# Patient Record
Sex: FEMALE | Race: BLACK OR AFRICAN AMERICAN | NOT HISPANIC OR LATINO | Employment: FULL TIME | URBAN - METROPOLITAN AREA
[De-identification: names, ages, dates, MRNs, and addresses within clinical notes are randomized per-mention and may not be internally consistent; named-entity substitution may affect disease eponyms.]

---

## 2018-12-21 ENCOUNTER — HISTORICAL (OUTPATIENT)
Dept: INTERNAL MEDICINE | Facility: CLINIC | Age: 38
End: 2018-12-21

## 2018-12-21 LAB
ABS NEUT (OLG): 3.55 X10(3)/MCL (ref 2.1–9.2)
ALBUMIN SERPL-MCNC: 3.4 GM/DL (ref 3.4–5)
ALBUMIN/GLOB SERPL: 1 RATIO (ref 1–2)
ALP SERPL-CCNC: 98 UNIT/L (ref 45–117)
ALT SERPL-CCNC: 23 UNIT/L (ref 12–78)
APPEARANCE, UA: CLEAR
AST SERPL-CCNC: 18 UNIT/L (ref 15–37)
BACTERIA #/AREA URNS AUTO: ABNORMAL /[HPF]
BASOPHILS # BLD AUTO: 0.06 X10(3)/MCL
BASOPHILS NFR BLD AUTO: 1 %
BILIRUB SERPL-MCNC: 0.2 MG/DL (ref 0.2–1)
BILIRUB UR QL STRIP: NEGATIVE
BILIRUBIN DIRECT+TOT PNL SERPL-MCNC: <0.1 MG/DL
BILIRUBIN DIRECT+TOT PNL SERPL-MCNC: >0.1 MG/DL
BUN SERPL-MCNC: 10 MG/DL (ref 7–18)
CALCIUM SERPL-MCNC: 8.5 MG/DL (ref 8.5–10.1)
CHLORIDE SERPL-SCNC: 104 MMOL/L (ref 98–107)
CHOLEST SERPL-MCNC: 196 MG/DL
CHOLEST/HDLC SERPL: 3.1 {RATIO} (ref 0–4.4)
CO2 SERPL-SCNC: 27 MMOL/L (ref 21–32)
COLOR UR: ABNORMAL
CREAT SERPL-MCNC: 0.6 MG/DL (ref 0.6–1.3)
CREAT UR-MCNC: 127 MG/DL
EOSINOPHIL # BLD AUTO: 0.12 X10(3)/MCL
EOSINOPHIL NFR BLD AUTO: 2 %
ERYTHROCYTE [DISTWIDTH] IN BLOOD BY AUTOMATED COUNT: 14.9 % (ref 11.5–14.5)
EST. AVERAGE GLUCOSE BLD GHB EST-MCNC: 148 MG/DL
GLOBULIN SER-MCNC: 4.7 GM/ML (ref 2.3–3.5)
GLUCOSE (UA): NORMAL
GLUCOSE SERPL-MCNC: 96 MG/DL (ref 74–106)
HAV IGM SERPL QL IA: NONREACTIVE
HBA1C MFR BLD: 6.8 % (ref 4.2–6.3)
HBV CORE IGM SERPL QL IA: NONREACTIVE
HBV SURFACE AG SERPL QL IA: NEGATIVE
HCT VFR BLD AUTO: 36.4 % (ref 35–46)
HCV AB SERPL QL IA: NONREACTIVE
HDLC SERPL-MCNC: 64 MG/DL
HGB BLD-MCNC: 11.3 GM/DL (ref 12–16)
HGB UR QL STRIP: NEGATIVE
HIV 1+2 AB+HIV1 P24 AG SERPL QL IA: NONREACTIVE
HYALINE CASTS #/AREA URNS LPF: ABNORMAL /[LPF]
IMM GRANULOCYTES # BLD AUTO: 0.01 10*3/UL
IMM GRANULOCYTES NFR BLD AUTO: 0 %
KETONES UR QL STRIP: NEGATIVE
LDLC SERPL CALC-MCNC: 114 MG/DL (ref 0–130)
LEUKOCYTE ESTERASE UR QL STRIP: 250 LEU/UL
LYMPHOCYTES # BLD AUTO: 2.16 X10(3)/MCL
LYMPHOCYTES NFR BLD AUTO: 35 % (ref 13–40)
MCH RBC QN AUTO: 25 PG (ref 26–34)
MCHC RBC AUTO-ENTMCNC: 31 GM/DL (ref 31–37)
MCV RBC AUTO: 80.5 FL (ref 80–100)
MICROALBUMIN UR-MCNC: 18.8 MG/L (ref 0–19)
MICROALBUMIN/CREAT RATIO PNL UR: 14.8 MCG/MG CR (ref 0–29)
MONOCYTES # BLD AUTO: 0.31 X10(3)/MCL
MONOCYTES NFR BLD AUTO: 5 % (ref 4–12)
NEUTROPHILS # BLD AUTO: 3.55 X10(3)/MCL
NEUTROPHILS NFR BLD AUTO: 57 X10(3)/MCL
NITRITE UR QL STRIP: NEGATIVE
PH UR STRIP: 7.5 [PH] (ref 4.5–8)
PLATELET # BLD AUTO: 315 X10(3)/MCL (ref 130–400)
PMV BLD AUTO: 10.5 FL (ref 7.4–10.4)
POTASSIUM SERPL-SCNC: 3.9 MMOL/L (ref 3.5–5.1)
PROT SERPL-MCNC: 8.1 GM/DL (ref 6.4–8.2)
PROT UR QL STRIP: 10 MG/DL
RBC # BLD AUTO: 4.52 X10(6)/MCL (ref 4–5.2)
RBC #/AREA URNS AUTO: ABNORMAL /[HPF]
SODIUM SERPL-SCNC: 137 MMOL/L (ref 136–145)
SP GR UR STRIP: 1.02 (ref 1–1.03)
SQUAMOUS #/AREA URNS LPF: >100 /[LPF]
T PALLIDUM AB SER QL: NONREACTIVE
TRIGL SERPL-MCNC: 89 MG/DL
TSH SERPL-ACNC: 1.08 MIU/L (ref 0.36–3.74)
UROBILINOGEN UR STRIP-ACNC: NORMAL
VLDLC SERPL CALC-MCNC: 18 MG/DL
WBC # SPEC AUTO: 6.2 X10(3)/MCL (ref 4.5–11)
WBC #/AREA URNS AUTO: ABNORMAL /HPF

## 2019-03-13 ENCOUNTER — HISTORICAL (OUTPATIENT)
Dept: INTERNAL MEDICINE | Facility: CLINIC | Age: 39
End: 2019-03-13

## 2019-03-13 LAB
EST. AVERAGE GLUCOSE BLD GHB EST-MCNC: 140 MG/DL
HBA1C MFR BLD: 6.5 % (ref 4.2–6.3)

## 2022-04-10 ENCOUNTER — HISTORICAL (OUTPATIENT)
Dept: ADMINISTRATIVE | Facility: HOSPITAL | Age: 42
End: 2022-04-10

## 2022-04-26 VITALS
HEIGHT: 64 IN | BODY MASS INDEX: 36.47 KG/M2 | DIASTOLIC BLOOD PRESSURE: 85 MMHG | SYSTOLIC BLOOD PRESSURE: 137 MMHG | WEIGHT: 213.63 LBS

## 2024-03-07 DIAGNOSIS — D25.9 UTERINE LEIOMYOMA, UNSPECIFIED LOCATION: Primary | ICD-10-CM

## 2024-03-07 DIAGNOSIS — N92.1 EXCESSIVE AND FREQUENT MENSTRUATION WITH IRREGULAR CYCLE: ICD-10-CM

## 2024-03-14 ENCOUNTER — HOSPITAL ENCOUNTER (OUTPATIENT)
Facility: HOSPITAL | Age: 44
Discharge: HOME | End: 2024-03-16
Attending: INTERNAL MEDICINE | Admitting: INTERNAL MEDICINE
Payer: MEDICAID

## 2024-03-14 DIAGNOSIS — D25.0 SUBMUCOUS UTERINE FIBROID: ICD-10-CM

## 2024-03-14 DIAGNOSIS — D64.9 SYMPTOMATIC ANEMIA: ICD-10-CM

## 2024-03-14 DIAGNOSIS — I16.1 HYPERTENSIVE EMERGENCY WITHOUT CONGESTIVE HEART FAILURE: ICD-10-CM

## 2024-03-14 DIAGNOSIS — N30.01 ACUTE HEMORRHAGIC CYSTITIS: ICD-10-CM

## 2024-03-14 DIAGNOSIS — N89.8 VAGINAL MASS: Primary | ICD-10-CM

## 2024-03-14 DIAGNOSIS — N93.9 ABNORMAL UTERINE BLEEDING: ICD-10-CM

## 2024-03-14 DIAGNOSIS — N93.8 DYSFUNCTIONAL UTERINE BLEEDING: ICD-10-CM

## 2024-03-14 DIAGNOSIS — N17.9 AKI (ACUTE KIDNEY INJURY): ICD-10-CM

## 2024-03-14 DIAGNOSIS — I16.1 HYPERTENSIVE EMERGENCY: ICD-10-CM

## 2024-03-14 DIAGNOSIS — R07.9 CHEST PAIN: ICD-10-CM

## 2024-03-14 LAB
ABORH RETYPE: NORMAL
ALBUMIN SERPL-MCNC: 2.8 G/DL (ref 3.5–5)
ALBUMIN/GLOB SERPL: 0.6 RATIO (ref 1.1–2)
ALP SERPL-CCNC: 86 UNIT/L (ref 40–150)
ALT SERPL-CCNC: 7 UNIT/L (ref 0–55)
ANISOCYTOSIS BLD QL SMEAR: ABNORMAL
APPEARANCE UR: ABNORMAL
AST SERPL-CCNC: 8 UNIT/L (ref 5–34)
BACTERIA #/AREA URNS AUTO: ABNORMAL /HPF
BASOPHILS # BLD AUTO: 0.04 X10(3)/MCL
BASOPHILS NFR BLD AUTO: 0.3 %
BILIRUB SERPL-MCNC: 0.3 MG/DL
BILIRUB UR QL STRIP.AUTO: NEGATIVE
BUN SERPL-MCNC: 18.4 MG/DL (ref 7–18.7)
CALCIUM SERPL-MCNC: 9.2 MG/DL (ref 8.4–10.2)
CHLORIDE SERPL-SCNC: 104 MMOL/L (ref 98–107)
CO2 SERPL-SCNC: 22 MMOL/L (ref 22–29)
COLOR UR AUTO: YELLOW
CREAT SERPL-MCNC: 2.03 MG/DL (ref 0.55–1.02)
EOSINOPHIL # BLD AUTO: 0.06 X10(3)/MCL (ref 0–0.9)
EOSINOPHIL NFR BLD AUTO: 0.4 %
ERYTHROCYTE [DISTWIDTH] IN BLOOD BY AUTOMATED COUNT: 18.5 % (ref 11.5–17)
FERRITIN SERPL-MCNC: 124.24 NG/ML (ref 4.63–204)
GFR SERPLBLD CREATININE-BSD FMLA CKD-EPI: 31 MLS/MIN/1.73/M2
GLOBULIN SER-MCNC: 4.5 GM/DL (ref 2.4–3.5)
GLUCOSE SERPL-MCNC: 168 MG/DL (ref 74–100)
GLUCOSE UR QL STRIP.AUTO: NORMAL
GROUP & RH: NORMAL
HCT VFR BLD AUTO: 23.3 % (ref 37–47)
HGB BLD-MCNC: 6.8 G/DL (ref 12–16)
HOLD SPECIMEN: NORMAL
HYALINE CASTS #/AREA URNS LPF: ABNORMAL /LPF
HYPOCHROMIA BLD QL SMEAR: ABNORMAL
IMM GRANULOCYTES # BLD AUTO: 0.11 X10(3)/MCL (ref 0–0.04)
IMM GRANULOCYTES NFR BLD AUTO: 0.8 %
INDIRECT COOMBS: NORMAL
IRON SATN MFR SERPL: 4 % (ref 20–50)
IRON SERPL-MCNC: 8 UG/DL (ref 50–170)
KETONES UR QL STRIP.AUTO: NEGATIVE
LACTATE SERPL-SCNC: 1 MMOL/L (ref 0.5–2.2)
LEUKOCYTE ESTERASE UR QL STRIP.AUTO: 500
LYMPHOCYTES # BLD AUTO: 1.46 X10(3)/MCL (ref 0.6–4.6)
LYMPHOCYTES NFR BLD AUTO: 10.4 %
MAGNESIUM SERPL-MCNC: 2 MG/DL (ref 1.6–2.6)
MCH RBC QN AUTO: 20.5 PG (ref 27–31)
MCHC RBC AUTO-ENTMCNC: 29.2 G/DL (ref 33–36)
MCV RBC AUTO: 70.4 FL (ref 80–94)
MICROCYTES BLD QL SMEAR: ABNORMAL
MONOCYTES # BLD AUTO: 0.8 X10(3)/MCL (ref 0.1–1.3)
MONOCYTES NFR BLD AUTO: 5.7 %
MUCOUS THREADS URNS QL MICRO: ABNORMAL /LPF
NEUTROPHILS # BLD AUTO: 11.6 X10(3)/MCL (ref 2.1–9.2)
NEUTROPHILS NFR BLD AUTO: 82.4 %
NITRITE UR QL STRIP.AUTO: NEGATIVE
NRBC BLD AUTO-RTO: 0 %
PH UR STRIP.AUTO: 5.5 [PH]
PHOSPHATE SERPL-MCNC: 4.3 MG/DL (ref 2.3–4.7)
PLATELET # BLD AUTO: 398 X10(3)/MCL (ref 130–400)
PLATELET # BLD EST: ADEQUATE 10*3/UL
PMV BLD AUTO: 9.9 FL (ref 7.4–10.4)
POTASSIUM SERPL-SCNC: 3.7 MMOL/L (ref 3.5–5.1)
PROT SERPL-MCNC: 7.3 GM/DL (ref 6.4–8.3)
PROT UR QL STRIP.AUTO: ABNORMAL
RBC # BLD AUTO: 3.31 X10(6)/MCL (ref 4.2–5.4)
RBC #/AREA URNS AUTO: >100 /HPF
RBC MORPH BLD: ABNORMAL
RBC UR QL AUTO: ABNORMAL
SODIUM SERPL-SCNC: 139 MMOL/L (ref 136–145)
SP GR UR STRIP.AUTO: 1.01 (ref 1–1.03)
SPECIMEN OUTDATE: NORMAL
SQUAMOUS #/AREA URNS LPF: ABNORMAL /HPF
TEAR DROP CELL (OLG): ABNORMAL
TIBC SERPL-MCNC: 199 UG/DL (ref 70–310)
TIBC SERPL-MCNC: 207 UG/DL (ref 250–450)
TRANSFERRIN SERPL-MCNC: 200 MG/DL (ref 180–382)
TSH SERPL-ACNC: 0.96 UIU/ML (ref 0.35–4.94)
URATE CRY URNS QL MICRO: ABNORMAL /HPF
UROBILINOGEN UR STRIP-ACNC: NORMAL
WBC # SPEC AUTO: 14.07 X10(3)/MCL (ref 4.5–11.5)
WBC #/AREA URNS AUTO: ABNORMAL /HPF
WBC CLUMPS UR QL AUTO: ABNORMAL

## 2024-03-14 PROCEDURE — 96361 HYDRATE IV INFUSION ADD-ON: CPT

## 2024-03-14 PROCEDURE — 96365 THER/PROPH/DIAG IV INF INIT: CPT | Mod: 59

## 2024-03-14 PROCEDURE — 83605 ASSAY OF LACTIC ACID: CPT | Performed by: INTERNAL MEDICINE

## 2024-03-14 PROCEDURE — 81001 URINALYSIS AUTO W/SCOPE: CPT | Performed by: PHYSICIAN ASSISTANT

## 2024-03-14 PROCEDURE — 96375 TX/PRO/DX INJ NEW DRUG ADDON: CPT

## 2024-03-14 PROCEDURE — 99291 CRITICAL CARE FIRST HOUR: CPT

## 2024-03-14 PROCEDURE — 25000003 PHARM REV CODE 250

## 2024-03-14 PROCEDURE — P9016 RBC LEUKOCYTES REDUCED: HCPCS | Performed by: INTERNAL MEDICINE

## 2024-03-14 PROCEDURE — 86901 BLOOD TYPING SEROLOGIC RH(D): CPT | Performed by: PHYSICIAN ASSISTANT

## 2024-03-14 PROCEDURE — 51702 INSERT TEMP BLADDER CATH: CPT

## 2024-03-14 PROCEDURE — 25000003 PHARM REV CODE 250: Performed by: INTERNAL MEDICINE

## 2024-03-14 PROCEDURE — 87040 BLOOD CULTURE FOR BACTERIA: CPT | Performed by: INTERNAL MEDICINE

## 2024-03-14 PROCEDURE — 80053 COMPREHEN METABOLIC PANEL: CPT | Performed by: PHYSICIAN ASSISTANT

## 2024-03-14 PROCEDURE — G0378 HOSPITAL OBSERVATION PER HR: HCPCS

## 2024-03-14 PROCEDURE — 87154 CUL TYP ID BLD PTHGN 6+ TRGT: CPT | Performed by: INTERNAL MEDICINE

## 2024-03-14 PROCEDURE — 87077 CULTURE AEROBIC IDENTIFY: CPT | Performed by: INTERNAL MEDICINE

## 2024-03-14 PROCEDURE — 96367 TX/PROPH/DG ADDL SEQ IV INF: CPT | Mod: 59

## 2024-03-14 PROCEDURE — 87086 URINE CULTURE/COLONY COUNT: CPT | Performed by: PHYSICIAN ASSISTANT

## 2024-03-14 PROCEDURE — 83540 ASSAY OF IRON: CPT

## 2024-03-14 PROCEDURE — 85025 COMPLETE CBC W/AUTO DIFF WBC: CPT | Performed by: PHYSICIAN ASSISTANT

## 2024-03-14 PROCEDURE — 84100 ASSAY OF PHOSPHORUS: CPT | Performed by: INTERNAL MEDICINE

## 2024-03-14 PROCEDURE — 83735 ASSAY OF MAGNESIUM: CPT | Performed by: INTERNAL MEDICINE

## 2024-03-14 PROCEDURE — 84443 ASSAY THYROID STIM HORMONE: CPT | Performed by: INTERNAL MEDICINE

## 2024-03-14 PROCEDURE — 86923 COMPATIBILITY TEST ELECTRIC: CPT | Mod: 91 | Performed by: INTERNAL MEDICINE

## 2024-03-14 PROCEDURE — 82728 ASSAY OF FERRITIN: CPT

## 2024-03-14 PROCEDURE — 63600175 PHARM REV CODE 636 W HCPCS: Mod: JZ,JG | Performed by: INTERNAL MEDICINE

## 2024-03-14 PROCEDURE — 63600175 PHARM REV CODE 636 W HCPCS: Mod: JZ,JG

## 2024-03-14 PROCEDURE — 36430 TRANSFUSION BLD/BLD COMPNT: CPT

## 2024-03-14 RX ORDER — NALOXONE HCL 0.4 MG/ML
0.02 VIAL (ML) INJECTION
Status: DISCONTINUED | OUTPATIENT
Start: 2024-03-14 | End: 2024-03-16 | Stop reason: HOSPADM

## 2024-03-14 RX ORDER — DIPHENHYDRAMINE HCL 25 MG
25 CAPSULE ORAL NIGHTLY PRN
Status: DISCONTINUED | OUTPATIENT
Start: 2024-03-14 | End: 2024-03-16

## 2024-03-14 RX ORDER — SODIUM CHLORIDE 0.9 % (FLUSH) 0.9 %
10 SYRINGE (ML) INJECTION EVERY 12 HOURS PRN
Status: DISCONTINUED | OUTPATIENT
Start: 2024-03-14 | End: 2024-03-16 | Stop reason: HOSPADM

## 2024-03-14 RX ORDER — HYDRALAZINE HYDROCHLORIDE 20 MG/ML
10 INJECTION INTRAMUSCULAR; INTRAVENOUS EVERY 4 HOURS PRN
Status: DISCONTINUED | OUTPATIENT
Start: 2024-03-14 | End: 2024-03-16 | Stop reason: HOSPADM

## 2024-03-14 RX ORDER — HYDROCODONE BITARTRATE AND ACETAMINOPHEN 5; 325 MG/1; MG/1
1 TABLET ORAL EVERY 6 HOURS PRN
Status: DISCONTINUED | OUTPATIENT
Start: 2024-03-14 | End: 2024-03-16 | Stop reason: HOSPADM

## 2024-03-14 RX ORDER — GLUCAGON 1 MG
1 KIT INJECTION
Status: DISCONTINUED | OUTPATIENT
Start: 2024-03-14 | End: 2024-03-16 | Stop reason: HOSPADM

## 2024-03-14 RX ORDER — SODIUM CHLORIDE 9 MG/ML
INJECTION, SOLUTION INTRAVENOUS CONTINUOUS
Status: DISCONTINUED | OUTPATIENT
Start: 2024-03-14 | End: 2024-03-16

## 2024-03-14 RX ORDER — ACETAMINOPHEN 325 MG/1
650 TABLET ORAL EVERY 6 HOURS PRN
Status: DISCONTINUED | OUTPATIENT
Start: 2024-03-14 | End: 2024-03-16 | Stop reason: HOSPADM

## 2024-03-14 RX ORDER — ONDANSETRON 4 MG/1
8 TABLET, ORALLY DISINTEGRATING ORAL EVERY 8 HOURS PRN
Status: DISCONTINUED | OUTPATIENT
Start: 2024-03-14 | End: 2024-03-16

## 2024-03-14 RX ORDER — LABETALOL HCL 20 MG/4 ML
10 SYRINGE (ML) INTRAVENOUS
Status: COMPLETED | OUTPATIENT
Start: 2024-03-14 | End: 2024-03-14

## 2024-03-14 RX ORDER — METRONIDAZOLE 500 MG/100ML
500 INJECTION, SOLUTION INTRAVENOUS
Status: DISCONTINUED | OUTPATIENT
Start: 2024-03-14 | End: 2024-03-16 | Stop reason: HOSPADM

## 2024-03-14 RX ORDER — HYDROCODONE BITARTRATE AND ACETAMINOPHEN 500; 5 MG/1; MG/1
TABLET ORAL
Status: DISCONTINUED | OUTPATIENT
Start: 2024-03-14 | End: 2024-03-16 | Stop reason: HOSPADM

## 2024-03-14 RX ORDER — IBUPROFEN 200 MG
16 TABLET ORAL
Status: DISCONTINUED | OUTPATIENT
Start: 2024-03-14 | End: 2024-03-16 | Stop reason: HOSPADM

## 2024-03-14 RX ORDER — DIPHENHYDRAMINE HCL 25 MG
25 CAPSULE ORAL NIGHTLY PRN
Status: DISCONTINUED | OUTPATIENT
Start: 2024-03-14 | End: 2024-03-14

## 2024-03-14 RX ORDER — DIPHENHYDRAMINE HCL 25 MG
25 CAPSULE ORAL EVERY 6 HOURS PRN
Status: DISCONTINUED | OUTPATIENT
Start: 2024-03-14 | End: 2024-03-14

## 2024-03-14 RX ORDER — TALC
6 POWDER (GRAM) TOPICAL NIGHTLY PRN
Status: DISCONTINUED | OUTPATIENT
Start: 2024-03-14 | End: 2024-03-16 | Stop reason: HOSPADM

## 2024-03-14 RX ORDER — LABETALOL HCL 20 MG/4 ML
10 SYRINGE (ML) INTRAVENOUS EVERY 4 HOURS PRN
Status: DISCONTINUED | OUTPATIENT
Start: 2024-03-14 | End: 2024-03-16 | Stop reason: HOSPADM

## 2024-03-14 RX ORDER — MEDROXYPROGESTERONE ACETATE 10 MG/1
20 TABLET ORAL DAILY
Status: DISCONTINUED | OUTPATIENT
Start: 2024-03-15 | End: 2024-03-16 | Stop reason: HOSPADM

## 2024-03-14 RX ORDER — IBUPROFEN 200 MG
24 TABLET ORAL
Status: DISCONTINUED | OUTPATIENT
Start: 2024-03-14 | End: 2024-03-16 | Stop reason: HOSPADM

## 2024-03-14 RX ADMIN — Medication 6 MG: at 11:03

## 2024-03-14 RX ADMIN — HYDROCODONE BITARTRATE AND ACETAMINOPHEN 1 TABLET: 5; 325 TABLET ORAL at 11:03

## 2024-03-14 RX ADMIN — DOXYCYCLINE 100 MG: 100 INJECTION, POWDER, LYOPHILIZED, FOR SOLUTION INTRAVENOUS at 06:03

## 2024-03-14 RX ADMIN — CEFTRIAXONE SODIUM 1 G: 1 INJECTION, POWDER, FOR SOLUTION INTRAMUSCULAR; INTRAVENOUS at 04:03

## 2024-03-14 RX ADMIN — LABETALOL HYDROCHLORIDE 10 MG: 5 INJECTION, SOLUTION INTRAVENOUS at 04:03

## 2024-03-14 RX ADMIN — METRONIDAZOLE 500 MG: 5 INJECTION, SOLUTION INTRAVENOUS at 07:03

## 2024-03-14 RX ADMIN — HYDRALAZINE HYDROCHLORIDE 10 MG: 20 INJECTION INTRAMUSCULAR; INTRAVENOUS at 06:03

## 2024-03-14 RX ADMIN — SODIUM CHLORIDE: 9 INJECTION, SOLUTION INTRAVENOUS at 05:03

## 2024-03-14 NOTE — CONSULTS
[facility] Consult H&P     Subjective:      History of Present Illness:  Elli Wu is a 44 y.o.  with pelvic pain and vaginal bleeding since D&C with primary OBGYN in [place] on 24. Pt endorses regular monthly menses prior to D&C. States she had D&C because her uterine lining was thick, does not recall why she had imaging of uterine lining in first place. Denies fevers, N/V, CP, SOB. Endorses fatigue.      Review of Systems:  Pertinent items are noted in HPI. All other systems are reviewed and are negative.    Past Medical History:  Past Medical History:   Diagnosis Date    Hypertension      Past Surgical History:  History reviewed. No pertinent surgical history.    Obstetrical History:  OB History   No obstetric history on file.    x6 BB 7#14    Gynecologic History:   Patient's last menstrual period was 2024.  STD history: denies  Pap smear history: denies hx abnormal; last pap unknown    Allergies:  Review of patient's allergies indicates:  No Known Allergies    Medications:   In-Hospital Scheduled Medications:   cefTRIAXone (Rocephin) IV (PEDS and ADULTS)  1 g Intravenous Q24H    doxycycline IV (PEDS and ADULTS)  100 mg Intravenous Q12H    [START ON 3/15/2024] medroxyPROGESTERone  20 mg Oral Daily    metronidazole  500 mg Intravenous Q8H      In-Hospital PRN Medications:  0.9%  NaCl infusion (for blood administration), dextrose 10%, dextrose 10%, glucagon (human recombinant), glucose, glucose, hydrALAZINE, labetalol, naloxone, ondansetron, sodium chloride 0.9%   In-Hospital IV Infusion Medications:   sodium chloride 0.9% 250 mL/hr at 24 1743      Home Medications:  Prior to Admission medications    Not on File       Family History:  History reviewed. No pertinent family history.    Social History:  Social History     Tobacco Use    Smoking status: Never    Smokeless tobacco: Never        Objective:   Last 24 Hour Vital Signs:  BP  Min: 169/93  Max: 204/96  Temp  Av.9 °F  (36.6 °C)  Min: 97.6 °F (36.4 °C)  Max: 98.1 °F (36.7 °C)  Pulse  Av.6  Min: 92  Max: 103  Resp  Av  Min: 16  Max: 27  SpO2  Av.5 %  Min: 98 %  Max: 100 %  Weight  Av kg (202 lb 13.2 oz)  Min: 92 kg (202 lb 13.2 oz)  Max: 92 kg (202 lb 13.2 oz)  No intake/output data recorded.  Body mass index is 34.63 kg/m².    Physical Examination:  Vitals:    24 1718 24 1719 24 1731 24 1746   BP: (!) 197/114  (!) 204/96 (!) 200/111   BP Location:       Patient Position:       Pulse: 97 98 98 96   Resp:  16 18 20   Temp:   97.9 °F (36.6 °C) 97.6 °F (36.4 °C)   TempSrc:       SpO2: 100% 100% 99% 99%   Weight:           Body mass index is 34.63 kg/m².    Gen: Alert, cooperative, no distress, appears stated age  CV: Regular rate  Chest: No conversational dyspnea  Abdomen: Soft, palpable mass in midline, mobile, likely uterus  Extrem: Extremities normal, atraumatic, no cyanosis or edema.  No calf tenderness or erythema.  External genitalia: Normal female genetalia without lesion, discharge or tenderness.  Speculum Exam: Vaginal vault with clear malodorous discharge; cervix unable to be visualized, obscurred by 5-6 cm necrotic mass, suspicious for prolapsed fibroid. No active bleeding noted  Bimanual Exam: Firm nodular 5-6 cm mass protruding from cervix, unable to palpate cervix behind mass, unable to palpate stalk. No parametrial masses nor firmness noted    Laboratory Results:  Lab Results   Component Value Date    WBC 14.07 (H) 2024    HGB 6.8 (L) 2024    HCT 23.3 (L) 2024    MCV 70.4 (L) 2024     2024     CMP  Sodium Level   Date Value Ref Range Status   2024 139 136 - 145 mmol/L Final     Potassium Level   Date Value Ref Range Status   2024 3.7 3.5 - 5.1 mmol/L Final     Carbon Dioxide   Date Value Ref Range Status   2024 22 22 - 29 mmol/L Final     Blood Urea Nitrogen   Date Value Ref Range Status   2024 18.4 7.0 - 18.7 mg/dL  Final     Creatinine   Date Value Ref Range Status   03/14/2024 2.03 (H) 0.55 - 1.02 mg/dL Final     Calcium Level Total   Date Value Ref Range Status   03/14/2024 9.2 8.4 - 10.2 mg/dL Final     Albumin Level   Date Value Ref Range Status   03/14/2024 2.8 (L) 3.5 - 5.0 g/dL Final     Bilirubin Total   Date Value Ref Range Status   03/14/2024 0.3 <=1.5 mg/dL Final     Alkaline Phosphatase   Date Value Ref Range Status   03/14/2024 86 40 - 150 unit/L Final     Aspartate Aminotransferase   Date Value Ref Range Status   03/14/2024 8 5 - 34 unit/L Final     Alanine Aminotransferase   Date Value Ref Range Status   03/14/2024 7 0 - 55 unit/L Final     eGFR   Date Value Ref Range Status   03/14/2024 31 mls/min/1.73/m2 Final         Radiology:  US Pelvis Comp with Transvag NON-OB (xpd    Result Date: 3/14/2024  EXAMINATION: US PELVIS COMP WITH TRANSVAG NON-OB (XPD) CLINICAL HISTORY: vaginal bleeding; TECHNIQUE: Transabdominal sonography of the pelvis was performed, followed by transvaginal sonography to better evaluate the uterus and ovaries. COMPARISON: CT abdomen pelvis 10/18/2021 FINDINGS: UTERUS/CERVIX: Size: 21 x 12 x 7 cm. Masses: Lower uterine segment obscured by shadowing on the transabdominal images.  Transvaginal imaging is suboptimal due to patient's inability to empty urinary bladder.  There is complex, heterogeneous echogenicity at the enlarged cervix measuring at least 8 cm.The area of interest is suboptimally visualized on transabdominal imaging due to shadowing of the pubic bone and on transvaginal imaging due to patient inability to adequately empty the urinary bladder. Endometrium: Suboptimally evaluated, estimated to measure 22 mm trans abdominally.. RIGHT OVARY: Size: 5 x 4 x 2 cm Appearance: Normal sonographic appearance. Vascular flow: Normal spectral waveforms. LEFT OVARY: Attempted visualization of the left ovary.  Left ovary not seen for unknown reason, possibly due to shadowing bowel gas and/or body  habitus. FREE FLUID: None     Abnormal, heterogeneous masslike enlargement of the cervix.  It is difficult to differentiate cervical mass from distension of the endocervical canal with blood products. Electronically signed by: Elif Mittal Date:    2024 Time:    16:08      CT A/P ordered     Assessment:     Elli Wu is a 44 y.o.  with pelvic pain and abnormal uterine bleeding likely 2/2 prolapsed fibroid. Also with HTN emergency with BP >180/110 and ISRAEL.        Recommendations:     - Uterine vs cervical mass: Upon review of imaging and physical exam suspect prolapsed uterine fibroid that is likely infected. Discussed with patient and primary team neoplasm cannot be ruled out, would recommend biopsy of mass at very least, but likely will need vaginal myomectomy.   Discussed with pt and primary team recommendation for antibiotics for likely intrauterine infection.   -- Will bring to clinic in AM for biopsy +/- removal of mass pending better BP control  -- doxycycline 100mg BID and flagyl 500mg BID for possible endometritis  -- provera 20mg daily to prevent uterine bleeding    - Enlarged uterus: 22cm uterus noted on exam tender to palpation, see above for antibiotics recommendation. Discussed unless has large fibroid that can be removed would likely not benefit from myomectomy and will need hysterectomy at some point, however will address prolapsed uterine mass first.     - Symptomatic anemia: agree with transfusion 2 units pRBCs, start provera 20mg daily to help prevent bleeding    - UTI: UA notable for blood, bacteria, concerning for UTI. Agree with ceftriaxone for empiric tx    Discussed with staff, Dr. Provost Angela De La Cruz MD, MPH  [facility], PGY4

## 2024-03-14 NOTE — ED PROVIDER NOTES
Encounter Date: 3/14/2024       History     Chief Complaint   Patient presents with    Vaginal Bleeding     PT REPORTS HEAVY VAG BLEEDING W CLOTS W LOWER LOWER ABD PAIN AND NAUSEA SINCE YESTERDAY.  RECENT D&C 2/20/24 W DX OF FIBROID.  CO URINARY HESITANCY, NO DYSURIA.  HX OF HTN, DID NOT TAKE BP MED. X 2 WKS.       Presents requesting Gyn appointment due to pelvic pain with vaginal bleeding secondary to fibroids. States her Gyn is in [place], he did a D/C on 2/20/24 after which he told her he was sending a referral to our Gyn clinic to be follow up. States did not receive pain medications or explain why her care was transferred to another Gyn. Hx of HTN.  States after D/C she spotted for a day, bleeding stopped and then started intermittently, now heavy, changing around every 2 hrs.    The history is provided by the patient.     Review of patient's allergies indicates:  No Known Allergies  Past Medical History:   Diagnosis Date    Hypertension      History reviewed. No pertinent surgical history.  History reviewed. No pertinent family history.  Social History     Tobacco Use    Smoking status: Never    Smokeless tobacco: Never     Review of Systems   Gastrointestinal:  Positive for abdominal distention.   Genitourinary:  Positive for pelvic pain and vaginal bleeding.   All other systems reviewed and are negative.      Physical Exam     Initial Vitals [03/14/24 1307]   BP Pulse Resp Temp SpO2   (!) 177/107 92 18 97.9 °F (36.6 °C) 100 %      MAP       --         Physical Exam    Nursing note and vitals reviewed.  Constitutional: She appears well-developed.   HENT:   Head: Normocephalic and atraumatic.   Mouth/Throat: Oropharynx is clear and moist.   Eyes: EOM are normal. Pupils are equal, round, and reactive to light.   Pale conjunctiva   Neck: Neck supple.   Normal range of motion.  Cardiovascular:  Normal rate, regular rhythm, normal heart sounds and intact distal pulses.           Pulmonary/Chest: Breath sounds  normal.   Abdominal: Abdomen is soft. Bowel sounds are normal. She exhibits distension. There is no abdominal tenderness. There is no rebound and no guarding.   Genitourinary:    Genitourinary Comments: Palpable mass among superior vaginal wall, abnormal cervix, blood clot on vault     Musculoskeletal:         General: No edema. Normal range of motion.      Cervical back: Normal range of motion and neck supple.     Neurological: She is alert and oriented to person, place, and time. She has normal strength. GCS score is 15. GCS eye subscore is 4. GCS verbal subscore is 5. GCS motor subscore is 6.   Skin: Skin is warm and dry. No rash noted. There is pallor.   Psychiatric: Her behavior is normal.         ED Course   Critical Care    Date/Time: 3/14/2024 4:33 PM    Performed by: Brandon Petty MD  Authorized by: Brandon Petty MD  Direct patient critical care time: 12 minutes  Additional history critical care time: 5 minutes  Ordering / reviewing critical care time: 12 minutes  Documentation critical care time: 5 minutes  Consulting other physicians critical care time: 5 minutes  Total critical care time (exclusive of procedural time) : 39 minutes  Critical care was necessary to treat or prevent imminent or life-threatening deterioration of the following conditions: circulatory failure and sepsis.  Critical care was time spent personally by me on the following activities: development of treatment plan with patient or surrogate, discussions with consultants, interpretation of cardiac output measurements, evaluation of patient's response to treatment, examination of patient, obtaining history from patient or surrogate, ordering and performing treatments and interventions, ordering and review of laboratory studies, ordering and review of radiographic studies, re-evaluation of patient's condition and review of old charts.        Labs Reviewed   COMPREHENSIVE METABOLIC PANEL - Abnormal; Notable  for the following components:       Result Value    Glucose Level 168 (*)     Creatinine 2.03 (*)     Albumin Level 2.8 (*)     Globulin 4.5 (*)     Albumin/Globulin Ratio 0.6 (*)     All other components within normal limits   URINALYSIS, REFLEX TO URINE CULTURE - Abnormal; Notable for the following components:    Appearance, UA Turbid (*)     Protein, UA Trace (*)     Blood, UA 3+ (*)     Leukocyte Esterase,  (*)     WBC, UA 21-50 (*)     WBC Clumps, UA Few (*)     Bacteria, UA Occ (*)     Squamous Epithelial Cells, UA Occ (*)     Mucous, UA Trace (*)     Uric Acid Crystals, UA Occ (*)     RBC, UA >100 (*)     All other components within normal limits   CBC WITH DIFFERENTIAL - Abnormal; Notable for the following components:    WBC 14.07 (*)     RBC 3.31 (*)     Hgb 6.8 (*)     Hct 23.3 (*)     MCV 70.4 (*)     MCH 20.5 (*)     MCHC 29.2 (*)     RDW 18.5 (*)     Neut # 11.60 (*)     IG# 0.11 (*)     All other components within normal limits   TSH - Normal   CULTURE, URINE   BLOOD CULTURE [facility]   BLOOD CULTURE [facility]   CBC W/ AUTO DIFFERENTIAL    Narrative:     The following orders were created for panel order CBC auto differential.  Procedure                               Abnormality         Status                     ---------                               -----------         ------                     CBC with Differential[[id number]]        Abnormal            Final result                 Please view results for these tests on the individual orders.   LACTIC ACID, PLASMA   MAGNESIUM   PHOSPHORUS   TYPE & SCREEN   ABORH RETYPE   PREPARE RBC SOFT          Imaging Results              US Pelvis Comp with Transvag NON-OB (xpd (Final result)  Result time 03/14/24 16:08:23      Final result by Elif Mittal MD (03/14/24 16:08:23)                   Impression:      Abnormal, heterogeneous masslike enlargement of the cervix.  It is difficult to differentiate cervical mass from distension of the endocervical  canal with blood products.      Electronically signed by: Elif Mittal  Date:    03/14/2024  Time:    16:08               Narrative:    EXAMINATION:  US PELVIS COMP WITH TRANSVAG NON-OB (XPD)    CLINICAL HISTORY:  vaginal bleeding;    TECHNIQUE:  Transabdominal sonography of the pelvis was performed, followed by transvaginal sonography to better evaluate the uterus and ovaries.    COMPARISON:  CT abdomen pelvis 10/18/2021    FINDINGS:  UTERUS/CERVIX:    Size: 21 x 12 x 7 cm.    Masses: Lower uterine segment obscured by shadowing on the transabdominal images.  Transvaginal imaging is suboptimal due to patient's inability to empty urinary bladder.  There is complex, heterogeneous echogenicity at the enlarged cervix measuring at least 8 cm.The area of interest is suboptimally visualized on transabdominal imaging due to shadowing of the pubic bone and on transvaginal imaging due to patient inability to adequately empty the urinary bladder.    Endometrium: Suboptimally evaluated, estimated to measure 22 mm trans abdominally..    RIGHT OVARY:    Size: 5 x 4 x 2 cm    Appearance: Normal sonographic appearance.    Vascular flow: Normal spectral waveforms.    LEFT OVARY:    Attempted visualization of the left ovary.  Left ovary not seen for unknown reason, possibly due to shadowing bowel gas and/or body habitus.    FREE FLUID:    None                                       Medications   cefTRIAXone (Rocephin) 1 g in dextrose 5 % in water (D5W) 100 mL IVPB (MB+) (has no administration in time range)   labetalol 20 mg/4 mL (5 mg/mL) IV syring (has no administration in time range)   0.9%  NaCl infusion (has no administration in time range)   0.9%  NaCl infusion (for blood administration) (has no administration in time range)     Medical Decision Making  Amount and/or Complexity of Data Reviewed  External Data Reviewed: radiology.     Details: Impression  No acute abnormality identified within the abdomen and pelvis  within  the limits of noncontrast exam.  Bilateral nonobstructing stones are noted with no evidence of  hydronephrosis. Previously identified right-sided obstructing stone  has resolved in the interval.      Electronically Signed By: Manny Mishra MD  Date/Time Signed: 10/18/2021 15:31    Labs: ordered. Decision-making details documented in ED Course.  Radiology: ordered.  Discussion of management or test interpretation with external provider(s): 4:21 PM Consult: I discussed the case with Dr. Simons (Gyn). Agrees with current management.   Recommends will evaluate in ED  4:21 PM Consult: I discussed the case with Dr. Pastrana ([facility]). Agrees with current management.   Recommends will evaluate in ED        Risk  Prescription drug management.      Additional MDM:   Differential Diagnosis:   Miscarriage, Threatening miscarriage, Traumatic injury, PID, STD, among others                                      Clinical Impression:  Final diagnoses:  [D64.9] Symptomatic anemia (Primary)  [I16.1] Hypertensive emergency without congestive heart failure  [N17.9] ISRAEL (acute kidney injury)  [N93.8] Dysfunctional uterine bleeding  [N30.01] Acute hemorrhagic cystitis          ED Disposition Condition    Admit Brandon Vick MD  03/14/24 [id number]

## 2024-03-14 NOTE — H&P
[facility] History & Physical Note     Resident Team: [facility] Medicine List 2  Attending Physician: Stephy Pierre MD  Resident: Suhas Barnard MD  Intern: Pooja Dang MD   Date of Admit: 3/14/2024    Chief Complaint     Vaginal Bleeding (PT REPORTS HEAVY VAG BLEEDING W CLOTS W LOWER LOWER ABD PAIN AND NAUSEA SINCE YESTERDAY.  RECENT D&C 2/20/24 W DX OF FIBROID.  CO URINARY HESITANCY, NO DYSURIA.  HX OF HTN, DID NOT TAKE BP MED. X 2 WKS.  )     Subjective:      History of Present Illness:  Elli Wu is a 44 y.o. female with a PMHx of HTN, uterine leiomyoma, who presented to [facility] ED on 3/14/2024  with a primary complaint of pelvic pain and vaginal bleeding. Onset was on 02/20/24 after patient underwent D&C in setting of thickened endometrium per patient. After the D/C, patient started having increasing uterine bleeding with passage of large clots and endometrial tissue. Out of concern, she went back to her OBGYN, which she was found to have uterine fibroids. She was then preferred to [facility] for further care. The patient admits to going through one full pack of pads daily these past few days and worsening suprapubic pain. Due to her OBYGN no prescribing her any pain medication, she decided to go the ED due to unbearable pain. She states she took some Norco from her pain in the interm for pain. She also admits that her OBGYN was managing her BP.    In ED VS: /107, HR 92, RR 18, SpO2 100% on room air, T 97.9 F. CMP showed BUN/Cr 18.4/2.03, CBC showed H/H 6.8/23.3; TSH/Lactic acid wnl, UA showed Leukocyte esterase 500, negative nitrites. Pelvic US showed abnormal, heterogeneous masslike enlargement of the cervix . CT ABD/Pelvis ordered and pending. Blood x2 and urine cultures were collected. Patient received 1x Rocephin 1g and 1x Labetalol 20 mg IV in the ED. GYN was consulted in setting of AUB. Medicine was consulted in setting of HTN emergency.     Admit to service under observation for further management of  HTN emergency. Consults: GYN.     Past Medical History:  Past Medical History:   Diagnosis Date    Hypertension        Past Surgical History:  History reviewed. No pertinent surgical history.    Family History:  History reviewed. No pertinent family history.    Social History:  Social History     Tobacco Use    Smoking status: Never    Smokeless tobacco: Never       Allergies:  Review of patient's allergies indicates:  No Known Allergies    Home Medications:  Prior to Admission medications    Not on File       Review of Systems:  Review of Systems   Constitutional:  Positive for malaise/fatigue and weight loss. Negative for chills and fever.   Respiratory:  Negative for shortness of breath.    Cardiovascular:  Negative for chest pain.   Gastrointestinal:  Positive for abdominal pain, diarrhea and nausea. Negative for constipation and vomiting.   Neurological:  Positive for weakness.         Objective:   Last 24 Hour Vital Signs:  BP  Min: 169/93  Max: 184/108  Temp  Av °F (36.7 °C)  Min: 97.9 °F (36.6 °C)  Max: 98.1 °F (36.7 °C)  Pulse  Av  Min: 92  Max: 103  Resp  Av.5  Min: 16  Max: 27  SpO2  Av.5 %  Min: 98 %  Max: 100 %  Weight  Av kg (202 lb 13.2 oz)  Min: 92 kg (202 lb 13.2 oz)  Max: 92 kg (202 lb 13.2 oz)  Body mass index is 34.63 kg/m².  No intake/output data recorded.    Physical Examination:  Physical Exam  Vitals and nursing note reviewed.   Constitutional:       Appearance: Normal appearance.   HENT:      Head: Normocephalic and atraumatic.      Nose: Nose normal.      Mouth/Throat:      Mouth: Mucous membranes are dry.      Pharynx: Oropharynx is clear.   Eyes:      Extraocular Movements: Extraocular movements intact.      Pupils: Pupils are equal, round, and reactive to light.      Comments: Conjunctival pallor   Cardiovascular:      Rate and Rhythm: Normal rate and regular rhythm.      Pulses: Normal pulses.      Heart sounds: Normal heart sounds.   Pulmonary:      Effort:  Pulmonary effort is normal.      Breath sounds: Normal breath sounds. No wheezing.   Abdominal:      General: There is distension.      Tenderness: There is abdominal tenderness.      Comments: Uterine fundus palpable above umbilicus; tender   Musculoskeletal:         General: No swelling or tenderness. Normal range of motion.      Cervical back: Normal range of motion and neck supple.   Skin:     General: Skin is dry.      Capillary Refill: Capillary refill takes 2 to 3 seconds.   Neurological:      General: No focal deficit present.      Mental Status: She is alert and oriented to person, place, and time.        Laboratory:  Most Recent Data:  CBC:   Lab Results   Component Value Date    WBC 14.07 (H) 03/14/2024    HGB 6.8 (L) 03/14/2024    HCT 23.3 (L) 03/14/2024     03/14/2024    MCV 70.4 (L) 03/14/2024    RDW 18.5 (H) 03/14/2024     WBC Differential:   Recent Labs   Lab 03/14/24  1401   WBC 14.07*   HGB 6.8*   HCT 23.3*      MCV 70.4*     BMP:   Lab Results   Component Value Date     03/14/2024    K 3.7 03/14/2024    CO2 22 03/14/2024    BUN 18.4 03/14/2024    CREATININE 2.03 (H) 03/14/2024    CALCIUM 9.2 03/14/2024     LFTs:   Lab Results   Component Value Date    ALBUMIN 2.8 (L) 03/14/2024    BILITOT 0.3 03/14/2024    AST 8 03/14/2024    ALKPHOS 86 03/14/2024    ALT 7 03/14/2024     Thyroid:   Lab Results   Component Value Date    TSH 0.959 03/14/2024      Urinalysis:   Lab Results   Component Value Date    COLORU Brown 10/18/2021    PHUA 5.5 03/14/2024    NITRITE Negative 10/18/2021    KETONESU Negative 10/18/2021    UROBILINOGEN Normal 03/14/2024    WBCUA 21-50 (A) 03/14/2024       Trended Lab Data:  Recent Labs   Lab 03/14/24  1401   WBC 14.07*   HGB 6.8*   HCT 23.3*      MCV 70.4*   RDW 18.5*      K 3.7   CO2 22   BUN 18.4   CREATININE 2.03*   ALBUMIN 2.8*   BILITOT 0.3   AST 8   ALKPHOS 86   ALT 7     Microbiology Data:  Microbiology Results (last 7 days)       Procedure  Component Value Units Date/Time    Blood culture x two cultures. Draw prior to antibiotics. [[id number]] Collected: 03/14/24 1611    Order Status: Sent Specimen: Blood from Antecubital, Right Updated: 03/14/24 1637    Blood culture x two cultures. Draw prior to antibiotics. [[id number]] Collected: 03/14/24 1630    Order Status: Sent Specimen: Blood from Hand, Left Updated: 03/14/24 1636    Urine culture [[id number]] Collected: 03/14/24 1340    Order Status: Sent Specimen: Urine Updated: 03/14/24 1416           Radiology:  Imaging Results              US Pelvis Comp with Transvag NON-OB (xpd (Final result)  Result time 03/14/24 16:08:23      Final result by Elif Mittal MD (03/14/24 16:08:23)                   Impression:      Abnormal, heterogeneous masslike enlargement of the cervix.  It is difficult to differentiate cervical mass from distension of the endocervical canal with blood products.      Electronically signed by: Elif Mittal  Date:    03/14/2024  Time:    16:08               Narrative:    EXAMINATION:  US PELVIS COMP WITH TRANSVAG NON-OB (XPD)    CLINICAL HISTORY:  vaginal bleeding;    TECHNIQUE:  Transabdominal sonography of the pelvis was performed, followed by transvaginal sonography to better evaluate the uterus and ovaries.    COMPARISON:  CT abdomen pelvis 10/18/2021    FINDINGS:  UTERUS/CERVIX:    Size: 21 x 12 x 7 cm.    Masses: Lower uterine segment obscured by shadowing on the transabdominal images.  Transvaginal imaging is suboptimal due to patient's inability to empty urinary bladder.  There is complex, heterogeneous echogenicity at the enlarged cervix measuring at least 8 cm.The area of interest is suboptimally visualized on transabdominal imaging due to shadowing of the pubic bone and on transvaginal imaging due to patient inability to adequately empty the urinary bladder.    Endometrium: Suboptimally evaluated, estimated to measure 22 mm trans abdominally..    RIGHT  OVARY:    Size: 5 x 4 x 2 cm    Appearance: Normal sonographic appearance.    Vascular flow: Normal spectral waveforms.    LEFT OVARY:    Attempted visualization of the left ovary.  Left ovary not seen for unknown reason, possibly due to shadowing bowel gas and/or body habitus.    FREE FLUID:    None                                       Assessment & Plan:     HTN emergency; [place]  Patient on Lisinopril 10 mg qd for HTN for her home antihypertensive  After receiving 1x labetalol in ED, /93; patient asymptomatic  BP on admission: (!) 177/107  Lactic Acid: 1.0  Cardiac monitor, strict Is & Os  IV BP meds not required at this time due response with just one time labetalol push bringing BP to 160s/80s  Will just place PRN pushes at this time  Will maintain BP with goal titration of 25% decrease in blood pressure within the 1st hour and a goal of < 160/100 within the next 6 hours  Switch to p.o. medications after 24 hours provided BP has remained well controlled    AUB  Will order Provera 20 mg qd per GYN recommendations for controlled vaginal bleeding  Will have patient NPO at midnight for possible gynecological intervention in the AM    UTI vs PID   UA showed positive Leukocyte esterase but negative nitrites  Blood culture x2 and urine culture pending  Will start Rocephin, Doxy, and Flagyl per GYN recommmendations    Microcytic symptomatic anemia  H/H6.8/23.3  Type and Screened; plan to transfuse 2 units of pRBC with f/u CBC in the AM  Ordered Iron panel: TIBC/Iron/Ferritin/blood smear      CODE STATUS: Full Code  Access: pIV  Antibiotics: Rocephin 1g, Doxy 500 mg BID, Flagyl q8hrs  Diet: Clear Liquid; NPO at midnight  DVT Prophylaxis: Teds/SCDs   GI Prophylaxis: none needed  Fluids: none; receiving 2 units pRBC    Disposition: day 0 of admission for AUB, HTN emergency, and UTI/PID      Suhas Barnard MD     [facility] Resident HO-2  03/14/2024

## 2024-03-15 ENCOUNTER — ANESTHESIA EVENT (OUTPATIENT)
Dept: SURGERY | Facility: HOSPITAL | Age: 44
End: 2024-03-15
Payer: MEDICAID

## 2024-03-15 ENCOUNTER — ANESTHESIA (OUTPATIENT)
Dept: SURGERY | Facility: HOSPITAL | Age: 44
End: 2024-03-15
Payer: MEDICAID

## 2024-03-15 PROBLEM — I16.1 HYPERTENSIVE EMERGENCY: Status: RESOLVED | Noted: 2024-03-14 | Resolved: 2024-03-15

## 2024-03-15 PROBLEM — N89.8 VAGINAL MASS: Status: ACTIVE | Noted: 2024-03-15

## 2024-03-15 LAB
ALBUMIN SERPL-MCNC: 2.3 G/DL (ref 3.5–5)
ALBUMIN/GLOB SERPL: 0.6 RATIO (ref 1.1–2)
ALP SERPL-CCNC: 79 UNIT/L (ref 40–150)
ALT SERPL-CCNC: 7 UNIT/L (ref 0–55)
AST SERPL-CCNC: 7 UNIT/L (ref 5–34)
B-HCG FREE SERPL-ACNC: <2.42 MIU/ML
B-HCG SERPL QL: NEGATIVE
BASOPHILS # BLD AUTO: 0.03 X10(3)/MCL
BASOPHILS NFR BLD AUTO: 0.2 %
BILIRUB SERPL-MCNC: 0.4 MG/DL
BUN SERPL-MCNC: 17.8 MG/DL (ref 7–18.7)
CALCIUM SERPL-MCNC: 8.5 MG/DL (ref 8.4–10.2)
CHLORIDE SERPL-SCNC: 110 MMOL/L (ref 98–107)
CO2 SERPL-SCNC: 22 MMOL/L (ref 22–29)
CREAT SERPL-MCNC: 1.28 MG/DL (ref 0.55–1.02)
EOSINOPHIL # BLD AUTO: 0.1 X10(3)/MCL (ref 0–0.9)
EOSINOPHIL NFR BLD AUTO: 0.7 %
ERYTHROCYTE [DISTWIDTH] IN BLOOD BY AUTOMATED COUNT: 18.9 % (ref 11.5–17)
GFR SERPLBLD CREATININE-BSD FMLA CKD-EPI: 53 MLS/MIN/1.73/M2
GLOBULIN SER-MCNC: 3.9 GM/DL (ref 2.4–3.5)
GLUCOSE SERPL-MCNC: 112 MG/DL (ref 74–100)
HCT VFR BLD AUTO: 23.8 % (ref 37–47)
HGB BLD-MCNC: 7.4 G/DL (ref 12–16)
HOLD SPECIMEN: NORMAL
IMM GRANULOCYTES # BLD AUTO: 0.09 X10(3)/MCL (ref 0–0.04)
IMM GRANULOCYTES NFR BLD AUTO: 0.6 %
LYMPHOCYTES # BLD AUTO: 1.74 X10(3)/MCL (ref 0.6–4.6)
LYMPHOCYTES NFR BLD AUTO: 11.9 %
MAGNESIUM SERPL-MCNC: 1.6 MG/DL (ref 1.6–2.6)
MCH RBC QN AUTO: 22.4 PG (ref 27–31)
MCHC RBC AUTO-ENTMCNC: 31.1 G/DL (ref 33–36)
MCV RBC AUTO: 72.1 FL (ref 80–94)
MONOCYTES # BLD AUTO: 1 X10(3)/MCL (ref 0.1–1.3)
MONOCYTES NFR BLD AUTO: 6.9 %
NEUTROPHILS # BLD AUTO: 11.61 X10(3)/MCL (ref 2.1–9.2)
NEUTROPHILS NFR BLD AUTO: 79.7 %
NRBC BLD AUTO-RTO: 0 %
PHOSPHATE SERPL-MCNC: 4.7 MG/DL (ref 2.3–4.7)
PLATELET # BLD AUTO: 448 X10(3)/MCL (ref 130–400)
PMV BLD AUTO: 9.6 FL (ref 7.4–10.4)
POTASSIUM SERPL-SCNC: 3.6 MMOL/L (ref 3.5–5.1)
PROT SERPL-MCNC: 6.2 GM/DL (ref 6.4–8.3)
RBC # BLD AUTO: 3.3 X10(6)/MCL (ref 4.2–5.4)
SODIUM SERPL-SCNC: 141 MMOL/L (ref 136–145)
WBC # SPEC AUTO: 14.57 X10(3)/MCL (ref 4.5–11.5)

## 2024-03-15 PROCEDURE — D9220A PRA ANESTHESIA: Mod: ANES,,, | Performed by: ANESTHESIOLOGY

## 2024-03-15 PROCEDURE — 88331 PATH CONSLTJ SURG 1 BLK 1SPC: CPT | Mod: TC | Performed by: OBSTETRICS & GYNECOLOGY

## 2024-03-15 PROCEDURE — 84100 ASSAY OF PHOSPHORUS: CPT

## 2024-03-15 PROCEDURE — 27201423 OPTIME MED/SURG SUP & DEVICES STERILE SUPPLY: Performed by: OBSTETRICS & GYNECOLOGY

## 2024-03-15 PROCEDURE — 36000707: Performed by: OBSTETRICS & GYNECOLOGY

## 2024-03-15 PROCEDURE — 25000003 PHARM REV CODE 250: Performed by: SPECIALIST

## 2024-03-15 PROCEDURE — 83735 ASSAY OF MAGNESIUM: CPT

## 2024-03-15 PROCEDURE — 63600175 PHARM REV CODE 636 W HCPCS: Performed by: NURSE ANESTHETIST, CERTIFIED REGISTERED

## 2024-03-15 PROCEDURE — 63600175 PHARM REV CODE 636 W HCPCS: Performed by: SPECIALIST

## 2024-03-15 PROCEDURE — 96368 THER/DIAG CONCURRENT INF: CPT | Mod: 59

## 2024-03-15 PROCEDURE — 96367 TX/PROPH/DG ADDL SEQ IV INF: CPT

## 2024-03-15 PROCEDURE — 63600175 PHARM REV CODE 636 W HCPCS: Mod: JZ,JG

## 2024-03-15 PROCEDURE — 37000009 HC ANESTHESIA EA ADD 15 MINS: Performed by: OBSTETRICS & GYNECOLOGY

## 2024-03-15 PROCEDURE — 96365 THER/PROPH/DIAG IV INF INIT: CPT | Mod: 59

## 2024-03-15 PROCEDURE — 36000706: Performed by: OBSTETRICS & GYNECOLOGY

## 2024-03-15 PROCEDURE — 80053 COMPREHEN METABOLIC PANEL: CPT

## 2024-03-15 PROCEDURE — 25000003 PHARM REV CODE 250

## 2024-03-15 PROCEDURE — 96376 TX/PRO/DX INJ SAME DRUG ADON: CPT

## 2024-03-15 PROCEDURE — 85025 COMPLETE CBC W/AUTO DIFF WBC: CPT

## 2024-03-15 PROCEDURE — 84702 CHORIONIC GONADOTROPIN TEST: CPT | Performed by: INTERNAL MEDICINE

## 2024-03-15 PROCEDURE — 96361 HYDRATE IV INFUSION ADD-ON: CPT | Mod: 59

## 2024-03-15 PROCEDURE — G0378 HOSPITAL OBSERVATION PER HR: HCPCS

## 2024-03-15 PROCEDURE — 71000033 HC RECOVERY, INTIAL HOUR: Performed by: OBSTETRICS & GYNECOLOGY

## 2024-03-15 PROCEDURE — 94761 N-INVAS EAR/PLS OXIMETRY MLT: CPT

## 2024-03-15 PROCEDURE — 25000003 PHARM REV CODE 250: Performed by: NURSE ANESTHETIST, CERTIFIED REGISTERED

## 2024-03-15 PROCEDURE — D9220A PRA ANESTHESIA: Mod: CRNA,,, | Performed by: NURSE ANESTHETIST, CERTIFIED REGISTERED

## 2024-03-15 PROCEDURE — 96375 TX/PRO/DX INJ NEW DRUG ADDON: CPT

## 2024-03-15 PROCEDURE — 96366 THER/PROPH/DIAG IV INF ADDON: CPT

## 2024-03-15 PROCEDURE — 81025 URINE PREGNANCY TEST: CPT | Performed by: INTERNAL MEDICINE

## 2024-03-15 PROCEDURE — 86923 COMPATIBILITY TEST ELECTRIC: CPT | Mod: 91 | Performed by: INTERNAL MEDICINE

## 2024-03-15 PROCEDURE — 37000008 HC ANESTHESIA 1ST 15 MINUTES: Performed by: OBSTETRICS & GYNECOLOGY

## 2024-03-15 PROCEDURE — P9016 RBC LEUKOCYTES REDUCED: HCPCS | Performed by: INTERNAL MEDICINE

## 2024-03-15 PROCEDURE — 63600175 PHARM REV CODE 636 W HCPCS: Performed by: STUDENT IN AN ORGANIZED HEALTH CARE EDUCATION/TRAINING PROGRAM

## 2024-03-15 PROCEDURE — 25000003 PHARM REV CODE 250: Performed by: STUDENT IN AN ORGANIZED HEALTH CARE EDUCATION/TRAINING PROGRAM

## 2024-03-15 RX ORDER — MEPERIDINE HYDROCHLORIDE 25 MG/ML
12.5 INJECTION INTRAMUSCULAR; INTRAVENOUS; SUBCUTANEOUS ONCE
Status: COMPLETED | OUTPATIENT
Start: 2024-03-15 | End: 2024-03-15

## 2024-03-15 RX ORDER — LISINOPRIL 10 MG/1
10 TABLET ORAL DAILY
Status: DISCONTINUED | OUTPATIENT
Start: 2024-03-15 | End: 2024-03-16 | Stop reason: HOSPADM

## 2024-03-15 RX ORDER — LISINOPRIL 10 MG/1
10 TABLET ORAL DAILY
Qty: 30 TABLET | Refills: 0 | Status: SHIPPED | OUTPATIENT
Start: 2024-03-15 | End: 2024-05-01 | Stop reason: SDUPTHER

## 2024-03-15 RX ORDER — SENNOSIDES 8.6 MG/1
1 TABLET ORAL DAILY
Qty: 14 TABLET | Refills: 0 | Status: SHIPPED | OUTPATIENT
Start: 2024-03-15 | End: 2024-03-16

## 2024-03-15 RX ORDER — OXYCODONE HYDROCHLORIDE 5 MG/1
5 TABLET ORAL EVERY 4 HOURS PRN
Status: DISCONTINUED | OUTPATIENT
Start: 2024-03-15 | End: 2024-03-16 | Stop reason: HOSPADM

## 2024-03-15 RX ORDER — HYDROMORPHONE HYDROCHLORIDE 1 MG/ML
0.5 INJECTION, SOLUTION INTRAMUSCULAR; INTRAVENOUS; SUBCUTANEOUS EVERY 5 MIN PRN
Status: DISCONTINUED | OUTPATIENT
Start: 2024-03-15 | End: 2024-03-15

## 2024-03-15 RX ORDER — HYDROCODONE BITARTRATE AND ACETAMINOPHEN 500; 5 MG/1; MG/1
TABLET ORAL
Status: DISCONTINUED | OUTPATIENT
Start: 2024-03-15 | End: 2024-03-16 | Stop reason: HOSPADM

## 2024-03-15 RX ORDER — LIDOCAINE HYDROCHLORIDE 20 MG/ML
INJECTION INTRAVENOUS
Status: DISCONTINUED | OUTPATIENT
Start: 2024-03-15 | End: 2024-03-15

## 2024-03-15 RX ORDER — DOCUSATE SODIUM 100 MG/1
100 CAPSULE, LIQUID FILLED ORAL 2 TIMES DAILY
Status: DISCONTINUED | OUTPATIENT
Start: 2024-03-15 | End: 2024-03-16 | Stop reason: HOSPADM

## 2024-03-15 RX ORDER — MUPIROCIN 20 MG/G
OINTMENT TOPICAL 2 TIMES DAILY
Status: DISCONTINUED | OUTPATIENT
Start: 2024-03-15 | End: 2024-03-16 | Stop reason: HOSPADM

## 2024-03-15 RX ORDER — DEXAMETHASONE SODIUM PHOSPHATE 4 MG/ML
INJECTION, SOLUTION INTRA-ARTICULAR; INTRALESIONAL; INTRAMUSCULAR; INTRAVENOUS; SOFT TISSUE
Status: DISCONTINUED | OUTPATIENT
Start: 2024-03-15 | End: 2024-03-15

## 2024-03-15 RX ORDER — MIDAZOLAM HYDROCHLORIDE 1 MG/ML
2 INJECTION INTRAMUSCULAR; INTRAVENOUS ONCE AS NEEDED
Status: COMPLETED | OUTPATIENT
Start: 2024-03-15 | End: 2024-03-15

## 2024-03-15 RX ORDER — GABAPENTIN 300 MG/1
300 CAPSULE ORAL 3 TIMES DAILY
Qty: 90 CAPSULE | Refills: 11 | Status: SHIPPED | OUTPATIENT
Start: 2024-03-15 | End: 2024-03-16

## 2024-03-15 RX ORDER — ROCURONIUM BROMIDE 10 MG/ML
INJECTION, SOLUTION INTRAVENOUS
Status: DISCONTINUED | OUTPATIENT
Start: 2024-03-15 | End: 2024-03-15

## 2024-03-15 RX ORDER — DOCUSATE SODIUM 100 MG/1
100 CAPSULE, LIQUID FILLED ORAL DAILY
Qty: 30 CAPSULE | Refills: 0 | Status: SHIPPED | OUTPATIENT
Start: 2024-03-15 | End: 2024-03-16

## 2024-03-15 RX ORDER — HYDROMORPHONE HYDROCHLORIDE 1 MG/ML
1 INJECTION, SOLUTION INTRAMUSCULAR; INTRAVENOUS; SUBCUTANEOUS EVERY 6 HOURS PRN
Status: DISCONTINUED | OUTPATIENT
Start: 2024-03-15 | End: 2024-03-16 | Stop reason: HOSPADM

## 2024-03-15 RX ORDER — SUCCINYLCHOLINE CHLORIDE 20 MG/ML
INJECTION INTRAMUSCULAR; INTRAVENOUS
Status: DISCONTINUED | OUTPATIENT
Start: 2024-03-15 | End: 2024-03-15

## 2024-03-15 RX ORDER — BISACODYL 10 MG/1
10 SUPPOSITORY RECTAL DAILY PRN
Status: DISCONTINUED | OUTPATIENT
Start: 2024-03-15 | End: 2024-03-16 | Stop reason: HOSPADM

## 2024-03-15 RX ORDER — PROMETHAZINE HYDROCHLORIDE 25 MG/ML
25 INJECTION, SOLUTION INTRAMUSCULAR; INTRAVENOUS ONCE
Status: COMPLETED | OUTPATIENT
Start: 2024-03-15 | End: 2024-03-15

## 2024-03-15 RX ORDER — KETOROLAC TROMETHAMINE 30 MG/ML
INJECTION, SOLUTION INTRAMUSCULAR; INTRAVENOUS
Status: DISCONTINUED | OUTPATIENT
Start: 2024-03-15 | End: 2024-03-15

## 2024-03-15 RX ORDER — DIPHENHYDRAMINE HYDROCHLORIDE 50 MG/ML
25 INJECTION INTRAMUSCULAR; INTRAVENOUS ONCE AS NEEDED
Status: DISCONTINUED | OUTPATIENT
Start: 2024-03-15 | End: 2024-03-15

## 2024-03-15 RX ORDER — ONDANSETRON HYDROCHLORIDE 2 MG/ML
INJECTION, SOLUTION INTRAVENOUS
Status: DISCONTINUED | OUTPATIENT
Start: 2024-03-15 | End: 2024-03-15

## 2024-03-15 RX ORDER — POLYETHYLENE GLYCOL 3350 17 G/17G
17 POWDER, FOR SOLUTION ORAL DAILY
Status: DISCONTINUED | OUTPATIENT
Start: 2024-03-15 | End: 2024-03-16 | Stop reason: HOSPADM

## 2024-03-15 RX ORDER — PROCHLORPERAZINE EDISYLATE 5 MG/ML
5 INJECTION INTRAMUSCULAR; INTRAVENOUS ONCE AS NEEDED
Status: DISCONTINUED | OUTPATIENT
Start: 2024-03-15 | End: 2024-03-15

## 2024-03-15 RX ORDER — HYDROMORPHONE HYDROCHLORIDE 1 MG/ML
0.2 INJECTION, SOLUTION INTRAMUSCULAR; INTRAVENOUS; SUBCUTANEOUS EVERY 5 MIN PRN
Status: DISCONTINUED | OUTPATIENT
Start: 2024-03-15 | End: 2024-03-15

## 2024-03-15 RX ORDER — HYDROCHLOROTHIAZIDE 12.5 MG/1
12.5 TABLET ORAL DAILY
Status: DISCONTINUED | OUTPATIENT
Start: 2024-03-15 | End: 2024-03-16 | Stop reason: HOSPADM

## 2024-03-15 RX ORDER — OXYCODONE AND ACETAMINOPHEN 5; 325 MG/1; MG/1
2 TABLET ORAL ONCE
Status: DISCONTINUED | OUTPATIENT
Start: 2024-03-15 | End: 2024-03-15

## 2024-03-15 RX ORDER — SODIUM CHLORIDE 9 MG/ML
INJECTION, SOLUTION INTRAVENOUS CONTINUOUS
Status: DISCONTINUED | OUTPATIENT
Start: 2024-03-15 | End: 2024-03-16

## 2024-03-15 RX ORDER — SODIUM CHLORIDE, SODIUM LACTATE, POTASSIUM CHLORIDE, CALCIUM CHLORIDE 600; 310; 30; 20 MG/100ML; MG/100ML; MG/100ML; MG/100ML
INJECTION, SOLUTION INTRAVENOUS CONTINUOUS
Status: DISCONTINUED | OUTPATIENT
Start: 2024-03-15 | End: 2024-03-16 | Stop reason: HOSPADM

## 2024-03-15 RX ORDER — MUPIROCIN 20 MG/G
OINTMENT TOPICAL
Status: DISCONTINUED | OUTPATIENT
Start: 2024-03-15 | End: 2024-03-16 | Stop reason: HOSPADM

## 2024-03-15 RX ORDER — FAMOTIDINE 20 MG/1
20 TABLET, FILM COATED ORAL
Status: DISCONTINUED | OUTPATIENT
Start: 2024-03-15 | End: 2024-03-16 | Stop reason: HOSPADM

## 2024-03-15 RX ORDER — DIPHENHYDRAMINE HYDROCHLORIDE 50 MG/ML
25 INJECTION INTRAMUSCULAR; INTRAVENOUS EVERY 4 HOURS PRN
Status: DISCONTINUED | OUTPATIENT
Start: 2024-03-15 | End: 2024-03-16 | Stop reason: HOSPADM

## 2024-03-15 RX ORDER — ACETAMINOPHEN 325 MG/1
650 TABLET ORAL EVERY 6 HOURS
Status: DISCONTINUED | OUTPATIENT
Start: 2024-03-15 | End: 2024-03-16 | Stop reason: HOSPADM

## 2024-03-15 RX ORDER — ACETAMINOPHEN 325 MG/1
650 TABLET ORAL EVERY 6 HOURS
Qty: 240 TABLET | Refills: 0 | Status: SHIPPED | OUTPATIENT
Start: 2024-03-15 | End: 2024-03-16 | Stop reason: HOSPADM

## 2024-03-15 RX ORDER — FENTANYL CITRATE 50 UG/ML
INJECTION, SOLUTION INTRAMUSCULAR; INTRAVENOUS
Status: DISCONTINUED | OUTPATIENT
Start: 2024-03-15 | End: 2024-03-15

## 2024-03-15 RX ORDER — HYDROCHLOROTHIAZIDE 12.5 MG/1
12.5 TABLET ORAL DAILY
Qty: 30 TABLET | Refills: 0 | Status: SHIPPED | OUTPATIENT
Start: 2024-03-15 | End: 2024-05-01 | Stop reason: SDUPTHER

## 2024-03-15 RX ORDER — HYDROCODONE BITARTRATE AND ACETAMINOPHEN 500; 5 MG/1; MG/1
TABLET ORAL ONCE
Status: DISCONTINUED | OUTPATIENT
Start: 2024-03-15 | End: 2024-03-16 | Stop reason: HOSPADM

## 2024-03-15 RX ORDER — DIPHENHYDRAMINE HCL 25 MG
25 CAPSULE ORAL EVERY 4 HOURS PRN
Status: DISCONTINUED | OUTPATIENT
Start: 2024-03-15 | End: 2024-03-16 | Stop reason: HOSPADM

## 2024-03-15 RX ORDER — PROPOFOL 10 MG/ML
VIAL (ML) INTRAVENOUS
Status: DISCONTINUED | OUTPATIENT
Start: 2024-03-15 | End: 2024-03-15

## 2024-03-15 RX ORDER — IPRATROPIUM BROMIDE AND ALBUTEROL SULFATE 2.5; .5 MG/3ML; MG/3ML
3 SOLUTION RESPIRATORY (INHALATION) ONCE AS NEEDED
Status: DISCONTINUED | OUTPATIENT
Start: 2024-03-15 | End: 2024-03-15

## 2024-03-15 RX ORDER — OXYCODONE HYDROCHLORIDE 5 MG/1
5 TABLET ORAL EVERY 4 HOURS PRN
Status: DISCONTINUED | OUTPATIENT
Start: 2024-03-15 | End: 2024-03-16

## 2024-03-15 RX ORDER — ONDANSETRON HYDROCHLORIDE 2 MG/ML
4 INJECTION, SOLUTION INTRAVENOUS ONCE
Status: DISCONTINUED | OUTPATIENT
Start: 2024-03-15 | End: 2024-03-15

## 2024-03-15 RX ORDER — SODIUM CHLORIDE, SODIUM LACTATE, POTASSIUM CHLORIDE, CALCIUM CHLORIDE 600; 310; 30; 20 MG/100ML; MG/100ML; MG/100ML; MG/100ML
INJECTION, SOLUTION INTRAVENOUS CONTINUOUS
Status: DISCONTINUED | OUTPATIENT
Start: 2024-03-15 | End: 2024-03-16

## 2024-03-15 RX ORDER — ONDANSETRON 4 MG/1
8 TABLET, ORALLY DISINTEGRATING ORAL EVERY 8 HOURS PRN
Status: DISCONTINUED | OUTPATIENT
Start: 2024-03-15 | End: 2024-03-16 | Stop reason: HOSPADM

## 2024-03-15 RX ORDER — KETAMINE HCL IN 0.9 % NACL 50 MG/5 ML
SYRINGE (ML) INTRAVENOUS
Status: DISCONTINUED | OUTPATIENT
Start: 2024-03-15 | End: 2024-03-15

## 2024-03-15 RX ORDER — OXYCODONE AND ACETAMINOPHEN 5; 325 MG/1; MG/1
1 TABLET ORAL EVERY 4 HOURS PRN
Qty: 30 TABLET | Refills: 0 | Status: SHIPPED | OUTPATIENT
Start: 2024-03-15 | End: 2024-05-17

## 2024-03-15 RX ORDER — MAGNESIUM SULFATE HEPTAHYDRATE 40 MG/ML
2 INJECTION, SOLUTION INTRAVENOUS ONCE
Status: COMPLETED | OUTPATIENT
Start: 2024-03-15 | End: 2024-03-15

## 2024-03-15 RX ADMIN — HYDROMORPHONE HYDROCHLORIDE 0.5 MG: 1 INJECTION, SOLUTION INTRAMUSCULAR; INTRAVENOUS; SUBCUTANEOUS at 01:03

## 2024-03-15 RX ADMIN — METRONIDAZOLE 500 MG: 5 INJECTION, SOLUTION INTRAVENOUS at 02:03

## 2024-03-15 RX ADMIN — DIPHENHYDRAMINE HYDROCHLORIDE 25 MG: 25 CAPSULE ORAL at 12:03

## 2024-03-15 RX ADMIN — HYDROCODONE BITARTRATE AND ACETAMINOPHEN 1 TABLET: 5; 325 TABLET ORAL at 05:03

## 2024-03-15 RX ADMIN — PROPOFOL 150 MG: 10 INJECTION, EMULSION INTRAVENOUS at 12:03

## 2024-03-15 RX ADMIN — HYDROMORPHONE HYDROCHLORIDE 0.5 MG: 1 INJECTION, SOLUTION INTRAMUSCULAR; INTRAVENOUS; SUBCUTANEOUS at 02:03

## 2024-03-15 RX ADMIN — METRONIDAZOLE 500 MG: 5 INJECTION, SOLUTION INTRAVENOUS at 07:03

## 2024-03-15 RX ADMIN — HYDROCHLOROTHIAZIDE 12.5 MG: 12.5 TABLET ORAL at 04:03

## 2024-03-15 RX ADMIN — CEFTRIAXONE SODIUM 1 G: 1 INJECTION, POWDER, FOR SOLUTION INTRAMUSCULAR; INTRAVENOUS at 08:03

## 2024-03-15 RX ADMIN — POLYETHYLENE GLYCOL 3350 17 G: 17 POWDER, FOR SOLUTION ORAL at 03:03

## 2024-03-15 RX ADMIN — DOXYCYCLINE 100 MG: 100 INJECTION, POWDER, LYOPHILIZED, FOR SOLUTION INTRAVENOUS at 06:03

## 2024-03-15 RX ADMIN — HYDROCODONE BITARTRATE AND ACETAMINOPHEN 1 TABLET: 5; 325 TABLET ORAL at 10:03

## 2024-03-15 RX ADMIN — ROCURONIUM BROMIDE 5 MG: 10 INJECTION INTRAVENOUS at 12:03

## 2024-03-15 RX ADMIN — ONDANSETRON 4 MG: 2 INJECTION INTRAMUSCULAR; INTRAVENOUS at 01:03

## 2024-03-15 RX ADMIN — OXYCODONE HYDROCHLORIDE AND ACETAMINOPHEN 2 TABLET: 5; 325 TABLET ORAL at 02:03

## 2024-03-15 RX ADMIN — MAGNESIUM SULFATE HEPTAHYDRATE 2 G: 40 INJECTION, SOLUTION INTRAVENOUS at 09:03

## 2024-03-15 RX ADMIN — FENTANYL CITRATE 50 MCG: 50 INJECTION INTRAMUSCULAR; INTRAVENOUS at 12:03

## 2024-03-15 RX ADMIN — METRONIDAZOLE 500 MG: 5 INJECTION, SOLUTION INTRAVENOUS at 11:03

## 2024-03-15 RX ADMIN — SODIUM CHLORIDE, POTASSIUM CHLORIDE, SODIUM LACTATE AND CALCIUM CHLORIDE: 600; 310; 30; 20 INJECTION, SOLUTION INTRAVENOUS at 05:03

## 2024-03-15 RX ADMIN — LISINOPRIL 10 MG: 10 TABLET ORAL at 03:03

## 2024-03-15 RX ADMIN — LIDOCAINE HYDROCHLORIDE 50 MG: 20 INJECTION INTRAVENOUS at 12:03

## 2024-03-15 RX ADMIN — ACETAMINOPHEN 650 MG: 325 TABLET, FILM COATED ORAL at 12:03

## 2024-03-15 RX ADMIN — MIDAZOLAM HYDROCHLORIDE 2 MG: 1 INJECTION, SOLUTION INTRAMUSCULAR; INTRAVENOUS at 11:03

## 2024-03-15 RX ADMIN — SODIUM CHLORIDE, POTASSIUM CHLORIDE, SODIUM LACTATE AND CALCIUM CHLORIDE: 600; 310; 30; 20 INJECTION, SOLUTION INTRAVENOUS at 11:03

## 2024-03-15 RX ADMIN — DOCUSATE SODIUM 100 MG: 100 CAPSULE, LIQUID FILLED ORAL at 09:03

## 2024-03-15 RX ADMIN — DEXAMETHASONE SODIUM PHOSPHATE 8 MG: 4 INJECTION, SOLUTION INTRA-ARTICULAR; INTRALESIONAL; INTRAMUSCULAR; INTRAVENOUS; SOFT TISSUE at 12:03

## 2024-03-15 RX ADMIN — MUPIROCIN: 20 OINTMENT TOPICAL at 10:03

## 2024-03-15 RX ADMIN — DOXYCYCLINE 100 MG: 100 INJECTION, POWDER, LYOPHILIZED, FOR SOLUTION INTRAVENOUS at 07:03

## 2024-03-15 RX ADMIN — ACETAMINOPHEN 650 MG: 325 TABLET, FILM COATED ORAL at 07:03

## 2024-03-15 RX ADMIN — FENTANYL CITRATE 50 MCG: 50 INJECTION INTRAMUSCULAR; INTRAVENOUS at 01:03

## 2024-03-15 RX ADMIN — SODIUM CHLORIDE: 9 INJECTION, SOLUTION INTRAVENOUS at 07:03

## 2024-03-15 RX ADMIN — MEPERIDINE HYDROCHLORIDE 12.5 MG: 25 INJECTION INTRAMUSCULAR; INTRAVENOUS; SUBCUTANEOUS at 02:03

## 2024-03-15 RX ADMIN — Medication 25 MG: at 12:03

## 2024-03-15 RX ADMIN — SUCCINYLCHOLINE CHLORIDE 120 MG: 20 INJECTION, SOLUTION INTRAMUSCULAR; INTRAVENOUS; PARENTERAL at 12:03

## 2024-03-15 RX ADMIN — PROMETHAZINE HYDROCHLORIDE 25 MG: 25 INJECTION INTRAMUSCULAR; INTRAVENOUS at 02:03

## 2024-03-15 RX ADMIN — KETOROLAC TROMETHAMINE 30 MG: 30 INJECTION, SOLUTION INTRAMUSCULAR at 01:03

## 2024-03-15 NOTE — ANESTHESIA PREPROCEDURE EVALUATION
"                                                                                                             03/15/2024  Elli Wu is a 44 y.o., female.vaginal myomectomy with [facility] [place] D&C     Vitals:    03/15/24 0428 03/15/24 0527 03/15/24 0549 03/15/24 0731   BP: (!) 157/82  (!) 157/82 (!) 143/85   Pulse: 99  99 96   Resp: 18 18 18 18   Temp: 36.5 °C (97.7 °F)  36.5 °C (97.7 °F) 37 °C (98.6 °F)   TempSrc: Oral   Oral   SpO2: 99%  99% 99%   Weight:   92 kg (202 lb 13.2 oz)    Height:   5' 5" (1.651 m)        [id number] history hypertenisve emergency (admitted 3.14.24), AUB, uterine vs. Cervical mass and history of prolapsed fibroid and pain, urinary obstruction;  s/p D&C at OSH 2024    S/P 2 U PRBC on admission with DOS H/H 7.4/23.8    UPT status neg    T and C x 2 units available    RSI    Lab Results   Component Value Date    WBC 14.57 (H) 03/15/2024    HGB 7.4 (L) 03/15/2024    HCT 23.8 (L) 03/15/2024     (H) 03/15/2024    CHOL 178 2021    TRIG 106 2021    HDL 60 2021    ALT 7 03/15/2024    AST 7 03/15/2024     03/15/2024    K 3.6 03/15/2024    CREATININE 1.28 (H) 03/15/2024    BUN 17.8 03/15/2024    CO2 22 03/15/2024    TSH 0.959 2024    HGBA1C 7.7 (H) 2021       Pre-op Assessment    I have reviewed the Patient Summary Reports.     I have reviewed the Nursing Notes. I have reviewed the NPO Status.   I have reviewed the Medications.     Review of Systems  Anesthesia Hx:  No problems with previous Anesthesia   History of prior surgery of interest to airway management or planning:          Denies Family Hx of Anesthesia complications.    Denies Personal Hx of Anesthesia complications.                    Hematology/Oncology:  Hematology Normal   Oncology Normal                                   EENT/Dental:  EENT/Dental Normal           Cardiovascular:  Cardiovascular Normal                                            Pulmonary:  Pulmonary Normal                     "   Renal/:  Renal/ Normal                 Hepatic/GI:  Hepatic/GI Normal                 Musculoskeletal:  Musculoskeletal Normal                Neurological:  Neurology Normal                                      Endocrine:  Endocrine Normal            Dermatological:  Skin Normal    Psych:  Psychiatric Normal                    Physical Exam  General: Well nourished    Airway:  Mallampati: II   Mouth Opening: Normal  TM Distance: Normal  Tongue: Normal  Neck ROM: Normal ROM    Dental:  Intact    Chest/Lungs:  Clear to auscultation, Normal Respiratory Rate    Heart:  Rate: Normal  Rhythm: Regular Rhythm        Anesthesia Plan  Type of Anesthesia, risks & benefits discussed:    Anesthesia Type: Gen ETT  Intra-op Monitoring Plan: Standard ASA Monitors  Post Op Pain Control Plan: multimodal analgesia and IV/PO Opioids PRN  Induction:  IV  Airway Plan: Direct  Informed Consent: Informed consent signed with the Patient and all parties understand the risks and agree with anesthesia plan.  All questions answered. Patient consented to blood products? Yes  ASA Score: 3  Day of Surgery Review of History & Physical: H&P Update referred to the surgeon/provider.    Ready For Surgery From Anesthesia Perspective.     .

## 2024-03-15 NOTE — ED NOTES
Attempted to call internal medicine doctors about the pt blood pressure the first call no answer the second call was forward to voicemail

## 2024-03-15 NOTE — ANESTHESIA POSTPROCEDURE EVALUATION
Anesthesia Post Evaluation    Patient: Elli Wu    Procedure(s) Performed: Procedure(s) (LRB):  MYOMECTOMY, HYSTEROSCOPIC (N/A)    Final Anesthesia Type: general      Patient location during evaluation: PACU  Patient participation: Yes- Able to Participate  Level of consciousness: awake and alert  Post-procedure vital signs: reviewed and stable  Pain management: adequate  Airway patency: patent    PONV status at discharge: No PONV  Anesthetic complications: no      Cardiovascular status: hemodynamically stable  Respiratory status: unassisted  Hydration status: euvolemic  Follow-up not needed.              Vitals Value Taken Time   /89 03/15/24 1555   Temp 36.6 °C (97.9 °F) 03/15/24 1528   Pulse 92 03/15/24 1555   Resp 18 03/15/24 1528   SpO2 100 % 03/15/24 1555         Event Time   Out of Recovery 03/15/2024 14:26:58         Pain/Norris Score: Pain Rating Prior to Med Admin: 10 (3/15/2024  2:29 PM)  Pain Rating Post Med Admin: 10 (3/15/2024  3:23 PM)  Norris Score: 9 (3/15/2024  2:20 PM)

## 2024-03-15 NOTE — PROGRESS NOTES
"Progress Note  Gynecology      Reason for Admission:  Hypertensive emergency    SUBJECTIVE:     Elli Wu is a 44 y.o.  admitted to medicine service with hypertensive emergency, also with AUB and uterine versus cervical mass, concern for prolapsed fibroid.     Patient seen resting comfortably in bed this morning. Reports minimal vaginal bleeding overnight. Continued abdominal pain, well controlled with PRN medications. Reports she has had weak stream and only able to dribble urine since D&C. Gomez was placed last night with some relief of abdominal pain. NPO at this time. Denies nausea, vomiting, SOB, chest pain, extremity pain.     OBJECTIVE:     Vitals:    03/15/24 0428 03/15/24 0527 03/15/24 0549 03/15/24 0731   BP: (!) 157/82  (!) 157/82 (!) 143/85   Pulse: 99  99 96   Resp: 18 18 18 18   Temp: 97.7 °F (36.5 °C)  97.7 °F (36.5 °C) 98.6 °F (37 °C)   TempSrc: Oral   Oral   SpO2: 99%  99% 99%   Weight:   92 kg (202 lb 13.2 oz)    Height:   5' 5" (1.651 m)      Body mass index is 33.75 kg/m².    Physical Exam:   General: Alert and oriented, in no acute distress  Lungs: Clear to auscultation bilaterally, no conversational dyspnea  Heart: Regular rate and rhythm  Abdomen: Soft, diffusely tender to palpation, uterine fundus palpated 2 cm above umbilicus, no involuntary guarding, no rebound tenderness  Extremities: Atraumatic, non-edematous, no cords or calf tenderness, no significant calf/ankle edema    Laboratory:  Recent Results (from the past 24 hour(s))   Urinalysis, Reflex to Urine Culture    Collection Time: 24  1:40 PM    Specimen: Urine   Result Value Ref Range    Color, UA Yellow Yellow, Light-Yellow, Dark Yellow, Mel, Straw    Appearance, UA Turbid (A) Clear    Specific Gravity, UA 1.010 1.005 - 1.030    pH, UA 5.5 5.0 - 8.5    Protein, UA Trace (A) Negative    Glucose, UA Normal Negative, Normal    Ketones, UA Negative Negative    Blood, UA 3+ (A) Negative    Bilirubin, UA Negative " Negative    Urobilinogen, UA Normal 0.2, 1.0, Normal    Nitrites, UA Negative Negative    Leukocyte Esterase,  (A) Negative    WBC, UA 21-50 (A) None Seen, 0-2, 3-5, 0-5 /HPF    WBC Clumps, UA Few (A) None Seen, 0-5    Bacteria, UA Occ (A) None Seen /HPF    Squamous Epithelial Cells, UA Occ (A) None Seen /HPF    Mucous, UA Trace (A) None Seen /LPF    Hyaline Casts, UA None Seen None Seen /lpf    Uric Acid Crystals, UA Occ (A) None Seen /HPF    RBC, UA >100 (A) None Seen, 0-2, 3-5, 0-5 /HPF   Urine culture    Collection Time: 03/14/24  1:40 PM    Specimen: Urine   Result Value Ref Range    Urine Culture No Growth At 24 Hours    Comprehensive metabolic panel    Collection Time: 03/14/24  2:01 PM   Result Value Ref Range    Sodium Level 139 136 - 145 mmol/L    Potassium Level 3.7 3.5 - 5.1 mmol/L    Chloride 104 98 - 107 mmol/L    Carbon Dioxide 22 22 - 29 mmol/L    Glucose Level 168 (H) 74 - 100 mg/dL    Blood Urea Nitrogen 18.4 7.0 - 18.7 mg/dL    Creatinine 2.03 (H) 0.55 - 1.02 mg/dL    Calcium Level Total 9.2 8.4 - 10.2 mg/dL    Protein Total 7.3 6.4 - 8.3 gm/dL    Albumin Level 2.8 (L) 3.5 - 5.0 g/dL    Globulin 4.5 (H) 2.4 - 3.5 gm/dL    Albumin/Globulin Ratio 0.6 (L) 1.1 - 2.0 ratio    Bilirubin Total 0.3 <=1.5 mg/dL    Alkaline Phosphatase 86 40 - 150 unit/L    Alanine Aminotransferase 7 0 - 55 unit/L    Aspartate Aminotransferase 8 5 - 34 unit/L    eGFR 31 mls/min/1.73/m2   Type & Screen    Collection Time: 03/14/24  2:01 PM   Result Value Ref Range    Group & Rh A POS     Indirect Martha GEL NEG     Specimen Outdate 03/17/2024 23:59    CBC with Differential    Collection Time: 03/14/24  2:01 PM   Result Value Ref Range    WBC 14.07 (H) 4.50 - 11.50 x10(3)/mcL    RBC 3.31 (L) 4.20 - 5.40 x10(6)/mcL    Hgb 6.8 (L) 12.0 - 16.0 g/dL    Hct 23.3 (L) 37.0 - 47.0 %    MCV 70.4 (L) 80.0 - 94.0 fL    MCH 20.5 (L) 27.0 - 31.0 pg    MCHC 29.2 (L) 33.0 - 36.0 g/dL    RDW 18.5 (H) 11.5 - 17.0 %    Platelet 398 130 -  400 x10(3)/mcL    MPV 9.9 7.4 - 10.4 fL    Neut % 82.4 %    Lymph % 10.4 %    Mono % 5.7 %    Eos % 0.4 %    Basophil % 0.3 %    Lymph # 1.46 0.6 - 4.6 x10(3)/mcL    Neut # 11.60 (H) 2.1 - 9.2 x10(3)/mcL    Mono # 0.80 0.1 - 1.3 x10(3)/mcL    Eos # 0.06 0 - 0.9 x10(3)/mcL    Baso # 0.04 <=0.2 x10(3)/mcL    IG# 0.11 (H) 0 - 0.04 x10(3)/mcL    IG% 0.8 %    NRBC% 0.0 %   TSH    Collection Time: 03/14/24  2:01 PM   Result Value Ref Range    TSH 0.959 0.350 - 4.940 uIU/mL   Magnesium    Collection Time: 03/14/24  2:01 PM   Result Value Ref Range    Magnesium Level 2.00 1.60 - 2.60 mg/dL   Phosphorus    Collection Time: 03/14/24  2:01 PM   Result Value Ref Range    Phosphorus Level 4.3 2.3 - 4.7 mg/dL   Prepare RBC 2 Units; To give    Collection Time: 03/14/24  2:01 PM   Result Value Ref Range    UNIT NUMBER [id number]     UNIT ABO/RH A NEG     DISPENSE STATUS Transfused     Unit Expiration [id number]     Product Code I1553T69     Unit Blood Type Code 0600     CROSSMATCH INTERPRETATION Compatible     UNIT NUMBER [id number]     UNIT ABO/RH A POS     DISPENSE STATUS Transfused     Unit Expiration [id number]     Product Code C3683D95     Unit Blood Type Code 6200     CROSSMATCH INTERPRETATION Compatible    Blood Smear Microscopic Exam    Collection Time: 03/14/24  2:01 PM   Result Value Ref Range    RBC Morph Abnormal (A) Normal    Anisocytosis 1+ (A) (none)    Hypochromasia 1+ (A) (none)    Microcytosis 1+ (A) (none)    Tear Drops 1+ (A) (none)    Platelets Adequate Normal, Adequate   Lactic acid, plasma #1    Collection Time: 03/14/24  4:11 PM   Result Value Ref Range    Lactic Acid Level 1.0 0.5 - 2.2 mmol/L   ABORH RETYPE    Collection Time: 03/14/24  4:11 PM   Result Value Ref Range    ABORH Retype A POS    [place] Top Hold    Collection Time: 03/14/24  4:36 PM   Result Value Ref Range    Extra Tube Hold for add-ons.    Red Top Hold    Collection Time: 03/14/24  4:41 PM   Result Value Ref Range    Extra Tube Hold  for add-ons.    Light Green Top Hold    Collection Time: 03/14/24  4:41 PM   Result Value Ref Range    Extra Tube Hold for add-ons.    Lavender Top Hold    Collection Time: 03/14/24  4:41 PM   Result Value Ref Range    Extra Tube Hold for add-ons.    Gold Top Hold    Collection Time: 03/14/24  4:41 PM   Result Value Ref Range    Extra Tube Hold for add-ons.    Ferritin    Collection Time: 03/14/24  4:41 PM   Result Value Ref Range    Ferritin Level 124.24 4.63 - 204.00 ng/mL   Iron and TIBC    Collection Time: 03/14/24  4:41 PM   Result Value Ref Range    Iron Binding Capacity Unsaturated 199 70 - 310 ug/dL    Iron Level 8 (L) 50 - 170 ug/dL    Transferrin 200 180 - 382 mg/dL    Iron Binding Capacity Total 207 (L) 250 - 450 ug/dL    Iron Saturation 4 (L) 20 - 50 %   Comprehensive Metabolic Panel (CMP)    Collection Time: 03/15/24  4:20 AM   Result Value Ref Range    Sodium Level 141 136 - 145 mmol/L    Potassium Level 3.6 3.5 - 5.1 mmol/L    Chloride 110 (H) 98 - 107 mmol/L    Carbon Dioxide 22 22 - 29 mmol/L    Glucose Level 112 (H) 74 - 100 mg/dL    Blood Urea Nitrogen 17.8 7.0 - 18.7 mg/dL    Creatinine 1.28 (H) 0.55 - 1.02 mg/dL    Calcium Level Total 8.5 8.4 - 10.2 mg/dL    Protein Total 6.2 (L) 6.4 - 8.3 gm/dL    Albumin Level 2.3 (L) 3.5 - 5.0 g/dL    Globulin 3.9 (H) 2.4 - 3.5 gm/dL    Albumin/Globulin Ratio 0.6 (L) 1.1 - 2.0 ratio    Bilirubin Total 0.4 <=1.5 mg/dL    Alkaline Phosphatase 79 40 - 150 unit/L    Alanine Aminotransferase 7 0 - 55 unit/L    Aspartate Aminotransferase 7 5 - 34 unit/L    eGFR 53 mls/min/1.73/m2   Magnesium    Collection Time: 03/15/24  4:20 AM   Result Value Ref Range    Magnesium Level 1.60 1.60 - 2.60 mg/dL   Phosphorus    Collection Time: 03/15/24  4:20 AM   Result Value Ref Range    Phosphorus Level 4.7 2.3 - 4.7 mg/dL   CBC with Differential    Collection Time: 03/15/24  4:20 AM   Result Value Ref Range    WBC 14.57 (H) 4.50 - 11.50 x10(3)/mcL    RBC 3.30 (L) 4.20 - 5.40  x10(6)/mcL    Hgb 7.4 (L) 12.0 - 16.0 g/dL    Hct 23.8 (L) 37.0 - 47.0 %    MCV 72.1 (L) 80.0 - 94.0 fL    MCH 22.4 (L) 27.0 - 31.0 pg    MCHC 31.1 (L) 33.0 - 36.0 g/dL    RDW 18.9 (H) 11.5 - 17.0 %    Platelet 448 (H) 130 - 400 x10(3)/mcL    MPV 9.6 7.4 - 10.4 fL    Neut % 79.7 %    Lymph % 11.9 %    Mono % 6.9 %    Eos % 0.7 %    Basophil % 0.2 %    Lymph # 1.74 0.6 - 4.6 x10(3)/mcL    Neut # 11.61 (H) 2.1 - 9.2 x10(3)/mcL    Mono # 1.00 0.1 - 1.3 x10(3)/mcL    Eos # 0.10 0 - 0.9 x10(3)/mcL    Baso # 0.03 <=0.2 x10(3)/mcL    IG# 0.09 (H) 0 - 0.04 x10(3)/mcL    IG% 0.6 %    NRBC% 0.0 %       Diagnostic Results:  Narrative & Impression  START OF REPORT:  Technique: CT of the abdomen and pelvis was performed with axial images as well as sagittal and coronal reconstruction images without intravenous contrast.     Comparison: None available.     Clinical History: Vaginal Bleeding (PT REPORTS HEAVY VAG BLEEDING W CLOTS W LOWER LOWER ABD PAIN AND NAUSEA SINCE YESTERDAY. RECENT D&C 2/20/24 W DX OF FIBROID. CO URINARY HESITANCY, NO DYSURIA. HX OF HTN, DID NOT TAKE BP MED. X 2 WKS.     Dosage Information: Automated Exposure Control was utilized.     Findings:  Lines and Tubes: None.  Thorax:  Lungs: The visualized lung bases appear unremarkable.  Heart: The heart size is within normal limits.  Abdomen:  Abdominal Wall: No abdominal wall pathology is seen.  Liver: The liver appears unremarkable.  Biliary System: No intrahepatic or extrahepatic biliary duct dilatation is seen.  Gallbladder: Multiple varisized gallstones are seen in the gallbladder which otherwise appears unremarkable. Correlate with clinical and laboratory findings as regards further evaluation and follow-up.  Pancreas: The pancreas appears unremarkable.  Adrenals: The adrenal glands appear unremarkable.  Kidneys: Note of tiny scattered calcifications bilaterally which may reflect tiny nonobstructive stones. There is mild prominence of the bilateral renal  collecting system down to the visualized proximal ureters without an obstructing stone in the collecting system or ureter identified. The distal ureters are not adequately visualized likely compressed by the large pelvoabdominal mass as below.  Aorta: The abdominal aorta appears unremarkable.  IVC: Unremarkable.  Bowel:  Esophagus: The visualized esophagus appears unremarkable.  Stomach: The stomach appears unremarkable.  Duodenum: Unremarkable appearing duodenum.  Small Bowel: The small bowel appears unremarkable.  Colon: Nondistended.  Appendix: The appendix appears unremarkable and is seen on Image 68, Series 6 through Image 80, Series 6.  Peritoneum: No intraperitoneal free air or ascites is seen.     Pelvis:  Bladder: The bladder is markedly distended otherwise unremarkable.  Female:  Uterus: The uterus is enlarged. Two heterogeneous predominanly hypodense masses are seen in the posterosuperior and posteroinferior portion of the uterus measuring approximately 71.1 x 5.5 cm (series 8 image 82) and 10.5 x 93.7 cm (series 8 image 79), respectively. There is tiny hyperdense focus in the vaginal region (series 2 image 148 and series 8 image 75) which may reflect blood products. In addition, the aforementioned masses appear to mildly compress the rectum and sigmoid colon posteriorly. Minimal fluid is seen within the endometrium.  Ovaries: No adnexal masses are seen.     Bony structures:  Dorsal Spine: The visualized dorsal spine appears unremarkable.  Bony Pelvis: The visualized bony structures of the pelvis appear unremarkable.        Impression:  1. There is mild prominence of the bilateral renal collecting system down to the visualized proximal ureters without an obstructing stone in the collecting system or ureter identified. The distal ureters are not adequately visualized likely compressed by the large pelvoabdominal mass as below.  2. The uterus is enlarged. Two heterogeneous predominanly hypodense masses are  seen in the posterosuperior and posteroinferior portion of the uterus measuring approximately 71.1 x 5.5 cm (series 8 image 82) and 10.5 x 93.7 cm (series 8 image 79), respectively. There is tiny hyperdense focus in the vaginal region (series 2 image 148 and series 8 image 75) which may reflect blood products. In addition, the aforementioned masses appear to mildly compress the rectum and sigmoid colon posteriorly. These may represent uterine leiomyomas. Correlate with clinical and laboratory findings as regards additional evaluation such as ultrasound. Minimal fluid is seen within the endometrium.  3. Details and other findings as discussed above.        This result has not been signed. Information might be incomplete.    ASSESSMENT/PLAN:     Active Hospital Problems    Diagnosis  POA    *Hypertensive emergency [I16.1]  Unknown    Abnormal uterine bleeding [N93.9]  Unknown      Resolved Hospital Problems   No resolved problems to display.       Assessment:   Elli Wu is a 44 y.o.  admitted to medicine service with hypertensive emergency, also with AUB and uterine versus cervical mass, concern for prolapsed fibroid. Clinically stable at this time.     Plan:   - Will see in clinic this AM and biopsy mass, possibly remove mass  - Continue doxycycline and flagyl for suspected pelvic infection  - S/p rocephin for UTI, urine cultures pending  - Now s/p transfusion 2 units pRBCs, H/H noted to rise to 7.4/23.8. Continue to monitor for signs and symptoms of anemia  - Continue Provera 20 mg daily for bleeding control  - NPO at this time in case of need for surgical management   - Suspect urinary obstruction secondary to mass, john now in place with adequate urine output noted overnight  - Remainder of care per primary team    Patient and plan discussed with Dr. Jamil.     Rafaela Simons MD  [facility], PGY-2

## 2024-03-15 NOTE — ANESTHESIA PROCEDURE NOTES
Intubation    Date/Time: 3/15/2024 12:39 PM    Performed by: Anabela Espino CRNA  Authorized by: Asya Mata MD    Intubation:     Induction:  Intravenous    Intubated:  Postinduction    Mask Ventilation:  Easy with oral airway    Attempts:  1    Attempted By:  CRNA and student    Method of Intubation:  Direct    Blade:  Aimee 4    Laryngeal View Grade: Grade IIA - cords partially seen      Difficult Airway Encountered?: No      Complications:  None    Airway Device:  Oral endotracheal tube    Airway Device Size:  7.5    Style/Cuff Inflation:  Cuffed (inflated to minimal occlusive pressure)    Inflation Amount (mL):  6    Tube secured:  21    Secured at:  The lips    Placement Verified By:  Capnometry    Complicating Factors:  None    Findings Post-Intubation:  BS equal bilateral and atraumatic/condition of teeth unchanged

## 2024-03-15 NOTE — PLAN OF CARE
Pt brought to clinic for vaginal myomectomy. Pt placed in stirrups and bimanual exam performed with fibroid noted 1cm from vaginal introitus. Ring forceps placed around fibroid, mass noted to fall apart upon closure of ring forceps. Pt unable to tolerate further attempts to remove fibroid, decision made to proceed with EUA and vaginal myomectomy with hysteroscopy D&C in OR. Case requested, ordered placed, consents signed.     Angela De La Cruz MD, MPH  [facility], PGY4

## 2024-03-15 NOTE — OP NOTE
[facility] - [address] [place] [address] Telemetry  General Surgery  Operative Note    SUMMARY     Date of Procedure: 3/15/2024     Procedure:   Exam under anesthesia  Vaginal mass biopsy  Vaginoscopy        Surgeon(s) and Role:     * Doreen Jamil MD - Primary     * Angela De La Cruz MD - Resident - Chief     * Igor Feldman MD - Resident - Assisting     * Rafaela Simons MD - Resident - Assisting    Pre-Operative Diagnosis:   Vaginal mass  Abnormal Uterine bleeding  Urinary retention  Outlet obstruction    Post-Operative Diagnosis: Post-Op Diagnosis Codes:     * Abnormal uterine bleeding [N93.9]     * Submucous uterine fibroid [D25.0]    Anesthesia: General    EBL: 50 mL    IV Fluids: 700 mL    Urine output: 250 mL    Operative Findings:   Normal external female genitalia. Vaginal mucosa normal in appearance. Urethra and urethral meatus appear normal. Vaginal fornix palpated bilaterally, origin of mass extended beyond pelvis above pelvic inlet.  No discernable cervix or os, or normal tissue visually nor digitally.  Mass approximately 10 x 10 cm size noted to be heterogeneous, necrotic, friable, and malodorous, unclear if mass is of vaginal, cervical, or endometrial of origin. Fibrotic tissue noted deeper within mass    Description of Technical Procedures:   The patient was taken to the operating room with IVF running, SCDs were in place bilaterally on lower extremities for DVT prophylaxis. She was placed in supine position. General anesthesia was obtained without difficulty and found to be adequate.  She was placed in the dorsal lithotomy position with legs in Eddie type stirrups. Exam under anesthesia revealed the findings as above. She was prepped and draped in the normal sterile fashion. A john catheter was placed to drain the bladder. A weighted speculum was placed into posterior aspect of the vagina. The mass was grasped with ringed forceps, unable to be removed in whole, several biopsies of mass was taken.    A vaginoscopy was then performed around the mass. Posterior and anterior fornix visualize. No clear delineation from mass, vagina vs cervical tissue. Specimen was sent to pathology and for frozen section. The mass was again inspected and noted to be hemostatic. Vistaseal was applied with hemostasis noted. All instruments were removed from the vagina.    The patient tolerated the procedure well. The instrument and sponge counts were correct times two. The patient awaked from anesthesia and was taken to recovery in stable condition.    Dr. Jamil was present for the entire procedure.    Specimens:   Specimen (24h ago, onward)       Start     Ordered    03/15/24 1328  Specimen to Pathology  RELEASE UPON ORDERING        References:    Click here for ordering Quick Tip   Question:  Release to patient  Answer:  Immediate    03/15/24 1328                            Condition: Stable    Disposition: PACU - hemodynamically stable.    Igor Feldman MD PGY3  Obstetrics & Gynecology   03/15/2024

## 2024-03-15 NOTE — PLAN OF CARE
Problem: Adult Inpatient Plan of Care  Goal: Plan of Care Review  Outcome: Ongoing, Progressing  Flowsheets (Taken 3/15/2024 1736)  Plan of Care Reviewed With:   patient   family  Goal: Absence of Hospital-Acquired Illness or Injury  Outcome: Ongoing, Progressing  Intervention: Identify and Manage Fall Risk  Flowsheets (Taken 3/15/2024 1736)  Safety Promotion/Fall Prevention:   assistive device/personal item within reach   high risk medications identified   medications reviewed   lighting adjusted   nonskid shoes/socks when out of bed   side rails raised x 2   instructed to call staff for mobility  Intervention: Prevent Skin Injury  Flowsheets (Taken 3/15/2024 1736)  Body Position: position changed independently  Skin Protection: adhesive use limited  Intervention: Prevent and Manage VTE (Venous Thromboembolism) Risk  Flowsheets (Taken 3/15/2024 1736)  Activity Management: Ambulated to bathroom - L4  VTE Prevention/Management:   remove, assess skin, and reapply sequential compression device   ambulation promoted   bleeding risk assessed  Intervention: Prevent Infection  Flowsheets (Taken 3/15/2024 1736)  Infection Prevention:   hand hygiene promoted   rest/sleep promoted

## 2024-03-15 NOTE — PROGRESS NOTES
"[facility] Progress Note     Resident Team: [facility] Medicine List     Subjective:      Brief HPI:  Elli Wu is a 44 y.o. female with a PMHx of HTN, uterine leiomyoma, who presented to [facility] ED on 3/14/2024  with a primary complaint of pelvic pain and vaginal bleeding. Patient was admitted to medicine service with hypertensive emergency, also with AUB and uterine versus cervical mass, concern for prolapsed fibroid.     Hospital Course/Significant events:  24 - s/p 2 unit pRBC transfusion  03/15/24 - transfer of care to GYN as primary; Internal medicine will remain as consult for BP management    Interval History:   Nurse reports of no acute overnight events. Patient received 2 units pRBC overnight. H/H 7.4/23.8 from 6.8/23.3. BP improved overnight with prns. Patient was only taking Lisinopril 10 mg qd in the past but has not taken since 24. NPO for procedure in AM. Patient went for vaginal myomectomy in GYN clinic this AM for removal of fibroids. Patient did not tolerate procedure. Plan for EUA and vaginal myomectomy with hysteroscopy D&C in OR today per GYN. Denies fever, chills, lightheadedness, chest pain, palpitations, shortness of breath, nausea/vomiting but admits to pelvic pain.    Review of Systems:  ROS  - see HPI  Objective:     Last 24 Hour Vital Signs:  BP  Min: 143/85  Max: 218/127  Temp  Av.3 °F (36.8 °C)  Min: 97.6 °F (36.4 °C)  Max: 99.3 °F (37.4 °C)  Pulse  Av.5  Min: 92  Max: 116  Resp  Av.3  Min: 16  Max: 27  SpO2  Av.6 %  Min: 98 %  Max: 100 %  Height  Av' 5" (165.1 cm)  Min: 5' 5" (165.1 cm)  Max: 5' 5" (165.1 cm)  Weight  Av kg (202 lb 13.2 oz)  Min: 92 kg (202 lb 13.2 oz)  Max: 92 kg (202 lb 13.2 oz)  I/O last 3 completed shifts:  In: 587.1 [Blood:405; IV Piggyback:182.1]  Out: 3600 [Urine:3600]    Physical Examination:  Physical Exam  Constitutional:       Appearance: Normal appearance.   HENT:      Head: Normocephalic.      Mouth/Throat:      Mouth: " Mucous membranes are dry.      Pharynx: Oropharynx is clear.   Eyes:      Extraocular Movements: Extraocular movements intact.      Conjunctiva/sclera: Conjunctivae normal.      Pupils: Pupils are equal, round, and reactive to light.   Cardiovascular:      Rate and Rhythm: Normal rate and regular rhythm.      Pulses: Normal pulses.      Heart sounds: Normal heart sounds.   Pulmonary:      Effort: Pulmonary effort is normal.      Breath sounds: Normal breath sounds.   Abdominal:      General: There is distension.      Tenderness: There is abdominal tenderness (uterus tender; palable above umbilicus, firm/midline).   Musculoskeletal:         General: Normal range of motion.      Cervical back: Normal range of motion and neck supple.   Skin:     General: Skin is warm.      Capillary Refill: Capillary refill takes less than 2 seconds.   Neurological:      General: No focal deficit present.      Mental Status: She is alert and oriented to person, place, and time.           Laboratory:  Most Recent Data:  CBC:   Lab Results   Component Value Date    WBC 14.57 (H) 03/15/2024    HGB 7.4 (L) 03/15/2024    HCT 23.8 (L) 03/15/2024     (H) 03/15/2024    MCV 72.1 (L) 03/15/2024    RDW 18.9 (H) 03/15/2024     WBC Differential:   Recent Labs   Lab 03/14/24  1401 03/15/24  0420   WBC 14.07* 14.57*   HGB 6.8* 7.4*   HCT 23.3* 23.8*    448*   MCV 70.4* 72.1*     BMP:   Lab Results   Component Value Date     03/15/2024    K 3.6 03/15/2024    CO2 22 03/15/2024    BUN 17.8 03/15/2024    CREATININE 1.28 (H) 03/15/2024    CALCIUM 8.5 03/15/2024    MG 1.60 03/15/2024    PHOS 4.7 03/15/2024     Thyroid:   Lab Results   Component Value Date    TSH 0.959 03/14/2024      Anemia:   Lab Results   Component Value Date    IRON 8 (L) 03/14/2024    TIBC 207 (L) 03/14/2024    FERRITIN 124.24 03/14/2024     Microbiology Data:  Microbiology Results (last 7 days)       Procedure Component Value Units Date/Time    Urine culture  [[id number]] Collected: 03/14/24 1340    Order Status: Completed Specimen: Urine Updated: 03/15/24 0634     Urine Culture No Growth At 24 Hours    Blood culture x two cultures. Draw prior to antibiotics. [[id number]] Collected: 03/14/24 1611    Order Status: Resulted Specimen: Blood from Antecubital, Right Updated: 03/14/24 1637    Blood culture x two cultures. Draw prior to antibiotics. [[id number]] Collected: 03/14/24 1630    Order Status: Resulted Specimen: Blood from Hand, Left Updated: 03/14/24 1636           Radiology:  Imaging Results              CT Abdomen Pelvis  Without Contrast (Final result)  Result time 03/15/24 10:01:24      Final result by Crescencio Horton MD (03/15/24 10:01:24)                   Impression:      No abnormality seen    1. There is mild prominence of the bilateral renal collecting system down to the visualized proximal ureters without an obstructing stone in the collecting system or ureter identified. The distal ureters are not adequately visualized likely compressed by the large pelviabdominal mass as below.    2. The uterus is enlarged. Two heterogeneous predominantly hypodense masses are seen in the posterosuperior and posteroinferior portion of the uterus measuring approximately 71.1 x 5.5 cm (series 8 image 82) and 10.5 x 93.7 cm (series 8 image 79), respectively. There is tiny hyperdense focus in the vaginal region (series 2 image 148 and series 8 image 75) which may reflect blood products. In addition, the aforementioned masses appear to mildly compress the rectum and sigmoid colon posteriorly. These may represent uterine leiomyomas. Correlate with clinical and laboratory findings as regards additional evaluation such as ultrasound. Minimal fluid is seen within the endometrium.    3. Details and other findings as discussed above.    There is concurrence with the preliminary report.  There is an additional finding of some blood in the vaginal vault and some air and fluid in  the vaginal wall likely consistent with blood.      Electronically signed by: Leo Horton  Date:    03/15/2024  Time:    10:01               Narrative:    EXAMINATION:  CT ABDOMEN PELVIS WITHOUT CONTRAST    CLINICAL HISTORY:  Metastatic disease evaluation;    TECHNIQUE:  Low dose axial images, sagittal and coronal reformations were obtained from the lung bases to the pubic symphysis.  No contrast was administered.  Automatic exposure control is utilized to reduce patient radiation exposure.    COMPARISON:  None    FINDINGS:  Lines and Tubes: None    Thorax:Lungs: The visualized lung bases appear unremarkable.    Heart: The heart size is within normal limits.    Abdomen:Abdominal Wall: No abdominal wall pathology is seen    Liver: The liver appears unremarkable.    Biliary System: No intrahepatic or extrahepatic biliary duct dilatation is seen.    Gallbladder: Multiple varisized gallstones are seen in the gallbladder which otherwise appears unremarkable. Correlate with clinical and laboratory findings as regards further evaluation and follow-up.    Pancreas: The pancreas appears unremarkable    Adrenals: The adrenal glands appear unremarkable.    Kidneys: Note of tiny scattered calcifications bilaterally which may reflect tiny nonobstructive stones. There is mild prominence of the bilateral renal collecting system down to the visualized proximal ureters without an obstructing stone in the collecting system or ureter identified. The distal ureters are not adequately visualized likely compressed by the large pelviabdominal mass as below    Aorta: The abdominal aorta appears unremarkable.    IVC: Unremarkable.    Bowel:Esophagus: The visualized esophagus appears unremarkable.    Stomach: The stomach appears unremarkable.    Duodenum: Unremarkable appearing duodenum.    Small Bowel: The small bowel appears unremarkable.    Colon: Nondistended.    Appendix: The appendix appears unremarkable and is seen on Image 68,  Series 6 through Image 80, Series 6.    Peritoneum: No intraperitoneal free air or ascites is seen.    Pelvis:Bladder: The bladder is markedly distended otherwise unremarkable.    Female:Uterus: The uterus is enlarged. Two heterogeneous predominantly hypodense masses are seen in the posterosuperior and posteroinferior portion of the uterus measuring approximately 71.1 x 5.5 cm (series 8 image 82) and 10.5 x 93.7 cm (series 8 image 79), respectively. There is tiny hyperdense focus in the vaginal region (series 2 image 148 and series 8 image 75) which may reflect blood products. In addition, the aforementioned masses appear to mildly compress the rectum and sigmoid colon posteriorly. Minimal fluid is seen within the endometrium    Ovaries: No adnexal masses are seen.    Bony structures:Dorsal Spine: The visualized dorsal spine appears unremarkable    Bony Pelvis: The visualized bony structures of the pelvis appear unremarkable.                        Preliminary result by Wilian West Jr., MD (03/14/24 21:01:42)                   Impression:    1. There is mild prominence of the bilateral renal collecting system down to the visualized proximal ureters without an obstructing stone in the collecting system or ureter identified. The distal ureters are not adequately visualized likely compressed by the large pelvoabdominal mass as below.  2. The uterus is enlarged. Two heterogeneous predominanly hypodense masses are seen in the posterosuperior and posteroinferior portion of the uterus measuring approximately 71.1 x 5.5 cm (series 8 image 82) and 10.5 x 93.7 cm (series 8 image 79), respectively. There is tiny hyperdense focus in the vaginal region (series 2 image 148 and series 8 image 75) which may reflect blood products. In addition, the aforementioned masses appear to mildly compress the rectum and sigmoid colon posteriorly. These may represent uterine leiomyomas. Correlate with clinical and laboratory findings  as regards additional evaluation such as ultrasound. Minimal fluid is seen within the endometrium.  3. Details and other findings as discussed above.               Narrative:    START OF REPORT:  Technique: CT of the abdomen and pelvis was performed with axial images as well as sagittal and coronal reconstruction images without intravenous contrast.    Comparison: None available.    Clinical History: Vaginal Bleeding (PT REPORTS HEAVY VAG BLEEDING W CLOTS W LOWER LOWER ABD PAIN AND NAUSEA SINCE YESTERDAY. RECENT D&C 2/20/24 W DX OF FIBROID. CO URINARY HESITANCY, NO DYSURIA. HX OF HTN, DID NOT TAKE BP MED. X 2 WKS.    Dosage Information: Automated Exposure Control was utilized.    Findings:  Lines and Tubes: None.  Thorax:  Lungs: The visualized lung bases appear unremarkable.  Heart: The heart size is within normal limits.  Abdomen:  Abdominal Wall: No abdominal wall pathology is seen.  Liver: The liver appears unremarkable.  Biliary System: No intrahepatic or extrahepatic biliary duct dilatation is seen.  Gallbladder: Multiple varisized gallstones are seen in the gallbladder which otherwise appears unremarkable. Correlate with clinical and laboratory findings as regards further evaluation and follow-up.  Pancreas: The pancreas appears unremarkable.  Adrenals: The adrenal glands appear unremarkable.  Kidneys: Note of tiny scattered calcifications bilaterally which may reflect tiny nonobstructive stones. There is mild prominence of the bilateral renal collecting system down to the visualized proximal ureters without an obstructing stone in the collecting system or ureter identified. The distal ureters are not adequately visualized likely compressed by the large pelvoabdominal mass as below.  Aorta: The abdominal aorta appears unremarkable.  IVC: Unremarkable.  Bowel:  Esophagus: The visualized esophagus appears unremarkable.  Stomach: The stomach appears unremarkable.  Duodenum: Unremarkable appearing  duodenum.  Small Bowel: The small bowel appears unremarkable.  Colon: Nondistended.  Appendix: The appendix appears unremarkable and is seen on Image 68, Series 6 through Image 80, Series 6.  Peritoneum: No intraperitoneal free air or ascites is seen.    Pelvis:  Bladder: The bladder is markedly distended otherwise unremarkable.  Female:  Uterus: The uterus is enlarged. Two heterogeneous predominanly hypodense masses are seen in the posterosuperior and posteroinferior portion of the uterus measuring approximately 71.1 x 5.5 cm (series 8 image 82) and 10.5 x 93.7 cm (series 8 image 79), respectively. There is tiny hyperdense focus in the vaginal region (series 2 image 148 and series 8 image 75) which may reflect blood products. In addition, the aforementioned masses appear to mildly compress the rectum and sigmoid colon posteriorly. Minimal fluid is seen within the endometrium.  Ovaries: No adnexal masses are seen.    Bony structures:  Dorsal Spine: The visualized dorsal spine appears unremarkable.  Bony Pelvis: The visualized bony structures of the pelvis appear unremarkable.                                         US Pelvis Comp with Transvag NON-OB (xpd (Final result)  Result time 03/14/24 16:08:23      Final result by Elif Mittal MD (03/14/24 16:08:23)                   Impression:      Abnormal, heterogeneous masslike enlargement of the cervix.  It is difficult to differentiate cervical mass from distension of the endocervical canal with blood products.      Electronically signed by: Elif Mittal  Date:    03/14/2024  Time:    16:08               Narrative:    EXAMINATION:  US PELVIS COMP WITH TRANSVAG NON-OB (XPD)    CLINICAL HISTORY:  vaginal bleeding;    TECHNIQUE:  Transabdominal sonography of the pelvis was performed, followed by transvaginal sonography to better evaluate the uterus and ovaries.    COMPARISON:  CT abdomen pelvis 10/18/2021    FINDINGS:  UTERUS/CERVIX:    Size: 21 x 12 x 7  cm.    Masses: Lower uterine segment obscured by shadowing on the transabdominal images.  Transvaginal imaging is suboptimal due to patient's inability to empty urinary bladder.  There is complex, heterogeneous echogenicity at the enlarged cervix measuring at least 8 cm.The area of interest is suboptimally visualized on transabdominal imaging due to shadowing of the pubic bone and on transvaginal imaging due to patient inability to adequately empty the urinary bladder.    Endometrium: Suboptimally evaluated, estimated to measure 22 mm trans abdominally..    RIGHT OVARY:    Size: 5 x 4 x 2 cm    Appearance: Normal sonographic appearance.    Vascular flow: Normal spectral waveforms.    LEFT OVARY:    Attempted visualization of the left ovary.  Left ovary not seen for unknown reason, possibly due to shadowing bowel gas and/or body habitus.    FREE FLUID:    None                                      Current Medications:     Infusions:   sodium chloride 0.9% 125 mL/hr at 03/15/24 0924    sodium chloride 0.9% 125 mL/hr at 03/15/24 0718    lactated ringers          Scheduled:   cefTRIAXone (Rocephin) IV (PEDS and ADULTS)  1 g Intravenous Q24H    doxycycline IV (PEDS and ADULTS)  100 mg Intravenous Q12H    medroxyPROGESTERone  20 mg Oral Daily    metronidazole  500 mg Intravenous Q8H        PRN:  0.9%  NaCl infusion (for blood administration), 0.9%  NaCl infusion (for blood administration), acetaminophen, dextrose 10%, dextrose 10%, diphenhydrAMINE, famotidine, glucagon (human recombinant), glucose, glucose, hydrALAZINE, HYDROcodone-acetaminophen, labetalol, melatonin, midazolam, mupirocin, naloxone, ondansetron, sodium chloride 0.9%    Antibiotics and Day Number of Therapy:  Rocephin 1 g daily, Flagyl 500 mg q8hrs, Doxy 100 mg BID (start date 03/14/24) - day 2    Assessment & Plan:     AUB in setting of prolapsed fibroid  Plan for EUA and vaginal myomectomy with hysteroscopy D&C in OR per GYN today  H/H this AM  7.4/23.8  Patient currently hemodynamically stable  On Provera 20 mg qd     HTN Emergency - resolved  BP 150s-160s/80s without drips and only of prn anti-hypertensives  Will start patient's home lisinopril 10 mg qd and HCTZ 12.5 mg qd today  Will monitor patient's BP and adjust moving forwards    ISRA[place] - resolving  BUN/Cr this AM improved to 17.8/1.28   Currently on  mL/hr; will monitor in AM      UTI vs PID  Currently on Rocephin 1 g daily, Flagyl 500 mg q8hrs, Doxy 100 mg BID  Urine cultures neg at 24hrs; pending blood culture x2 results         CODE STATUS: Full Code  Access: Peripheral  Antibiotics: Rocephin 1 g daily, Flagyl 500 mg q8hrs, Doxy 100 mg BID  Diet: Diabetic  DVT Prophylaxis: none  GI Prophylaxis: none needed  Fluids: lactated Ringer's 125 ml/hr.     Disposition: day 1 of admission for  AUB and HTN emergency      Suhas Barnard MD     [facility] Resident HO-1  03/15/2024

## 2024-03-15 NOTE — TRANSFER OF CARE
Anesthesia Transfer of Care Note    Patient: Elli Wu    Procedure(s) Performed: Procedure(s) (LRB):  MYOMECTOMY, HYSTEROSCOPIC (N/A)    Patient location: PACU    Anesthesia Type: general    Transport from OR: Transported from OR on room air with adequate spontaneous ventilation    Post pain: adequate analgesia    Post assessment: no apparent anesthetic complications    Post vital signs: stable    Level of consciousness: sedated    Nausea/Vomiting: no nausea/vomiting    Complications: none    Transfer of care protocol was followed

## 2024-03-15 NOTE — PLAN OF CARE
Problem: Adult Inpatient Plan of Care  Goal: Plan of Care Review  Outcome: Ongoing, Not Progressing  Goal: Patient-Specific Goal (Individualized)  Outcome: Ongoing, Not Progressing  Goal: Absence of Hospital-Acquired Illness or Injury  Outcome: Ongoing, Not Progressing  Goal: Optimal Comfort and Wellbeing  Outcome: Ongoing, Not Progressing  Goal: Readiness for Transition of Care  Outcome: Ongoing, Not Progressing     Problem: Infection  Goal: Absence of Infection Signs and Symptoms  Outcome: Ongoing, Not Progressing     Problem: Skin Injury Risk Increased  Goal: Skin Health and Integrity  Outcome: Ongoing, Not Progressing

## 2024-03-16 VITALS
DIASTOLIC BLOOD PRESSURE: 85 MMHG | RESPIRATION RATE: 18 BRPM | BODY MASS INDEX: 33.79 KG/M2 | TEMPERATURE: 98 F | SYSTOLIC BLOOD PRESSURE: 158 MMHG | OXYGEN SATURATION: 99 % | HEART RATE: 97 BPM | WEIGHT: 202.81 LBS | HEIGHT: 65 IN

## 2024-03-16 LAB
ACINETOBACTER CALCOACETICUS-BAUMANNII COMPLEX (OHS): NOT DETECTED
ALBUMIN SERPL-MCNC: 2.1 G/DL (ref 3.5–5)
ALBUMIN/GLOB SERPL: 0.5 RATIO (ref 1.1–2)
ALP SERPL-CCNC: 88 UNIT/L (ref 40–150)
ALT SERPL-CCNC: <5 UNIT/L (ref 0–55)
AST SERPL-CCNC: 6 UNIT/L (ref 5–34)
BACTERIA UR CULT: NO GROWTH
BACTEROIDES FRAGILIS (OHS): NOT DETECTED
BASOPHILS # BLD AUTO: 0.04 X10(3)/MCL
BASOPHILS NFR BLD AUTO: 0.2 %
BILIRUB SERPL-MCNC: 0.2 MG/DL
BUN SERPL-MCNC: 12.7 MG/DL (ref 7–18.7)
C AURIS DNA BLD POS QL NAA+NON-PROBE: NOT DETECTED
C GATTII+NEOFOR DNA CSF QL NAA+NON-PROBE: NOT DETECTED
CALCIUM SERPL-MCNC: 8.3 MG/DL (ref 8.4–10.2)
CANDIDA ALBICANS (OHS): NOT DETECTED
CANDIDA GLABRATA (OHS): NOT DETECTED
CANDIDA KRUSEI (OHS): NOT DETECTED
CANDIDA PARAPSILOSIS (OHS): NOT DETECTED
CANDIDA TROPICALIS (OHS): NOT DETECTED
CHLORIDE SERPL-SCNC: 106 MMOL/L (ref 98–107)
CO2 SERPL-SCNC: 23 MMOL/L (ref 22–29)
CREAT SERPL-MCNC: 0.93 MG/DL (ref 0.55–1.02)
CTX-M (OHS): NORMAL
ENTEROBACTER CLOACAE COMPLEX (OHS): NOT DETECTED
ENTEROBACTERALES (OHS): NOT DETECTED
ENTEROCOCCUS FAECALIS (OHS): NOT DETECTED
ENTEROCOCCUS FAECIUM (OHS): NOT DETECTED
EOSINOPHIL # BLD AUTO: 0.01 X10(3)/MCL (ref 0–0.9)
EOSINOPHIL NFR BLD AUTO: 0 %
ERYTHROCYTE [DISTWIDTH] IN BLOOD BY AUTOMATED COUNT: 20.4 % (ref 11.5–17)
ERYTHROCYTE [DISTWIDTH] IN BLOOD BY AUTOMATED COUNT: 20.5 % (ref 11.5–17)
ESCHERICHIA COLI (OHS): NOT DETECTED
GFR SERPLBLD CREATININE-BSD FMLA CKD-EPI: >60 MLS/MIN/1.73/M2
GLOBULIN SER-MCNC: 3.9 GM/DL (ref 2.4–3.5)
GLUCOSE SERPL-MCNC: 181 MG/DL (ref 74–100)
GP B STREP DNA CSF QL NAA+NON-PROBE: NOT DETECTED
HAEM INFLU DNA CSF QL NAA+NON-PROBE: NOT DETECTED
HCT VFR BLD AUTO: 26.5 % (ref 37–47)
HCT VFR BLD AUTO: 27 % (ref 37–47)
HGB BLD-MCNC: 8.4 G/DL (ref 12–16)
HGB BLD-MCNC: 8.5 G/DL (ref 12–16)
IMM GRANULOCYTES # BLD AUTO: 0.17 X10(3)/MCL (ref 0–0.04)
IMM GRANULOCYTES NFR BLD AUTO: 0.8 %
IMP (OHS): NORMAL
KLEBSIELLA AEROGENES (OHS): NOT DETECTED
KLEBSIELLA OXYTOCA (OHS): NOT DETECTED
KLEBSIELLA PNEUMONIAE GROUP (OHS): NOT DETECTED
KPC (OHS): NORMAL
L MONOCYTOG DNA CSF QL NAA+NON-PROBE: NOT DETECTED
LYMPHOCYTES # BLD AUTO: 1.39 X10(3)/MCL (ref 0.6–4.6)
LYMPHOCYTES NFR BLD AUTO: 6.5 %
MAGNESIUM SERPL-MCNC: 1.3 MG/DL (ref 1.6–2.6)
MCH RBC QN AUTO: 23.8 PG (ref 27–31)
MCH RBC QN AUTO: 23.9 PG (ref 27–31)
MCHC RBC AUTO-ENTMCNC: 31.5 G/DL (ref 33–36)
MCHC RBC AUTO-ENTMCNC: 31.7 G/DL (ref 33–36)
MCR-1 (OHS): NORMAL
MCV RBC AUTO: 75.1 FL (ref 80–94)
MCV RBC AUTO: 75.8 FL (ref 80–94)
MECA/C (OHS): NORMAL
MECA/C AND MREJ (MRSA)(OHS): NORMAL
MONOCYTES # BLD AUTO: 1 X10(3)/MCL (ref 0.1–1.3)
MONOCYTES NFR BLD AUTO: 4.6 %
N MEN DNA CSF QL NAA+NON-PROBE: NOT DETECTED
NDM (OHS): NORMAL
NEUTROPHILS # BLD AUTO: 18.92 X10(3)/MCL (ref 2.1–9.2)
NEUTROPHILS NFR BLD AUTO: 87.9 %
NRBC BLD AUTO-RTO: 0 %
NRBC BLD AUTO-RTO: 0 %
OXA-48-LIKE (OHS): NORMAL
PHOSPHATE SERPL-MCNC: 2.7 MG/DL (ref 2.3–4.7)
PLATELET # BLD AUTO: 426 X10(3)/MCL (ref 130–400)
PLATELET # BLD AUTO: 450 X10(3)/MCL (ref 130–400)
PMV BLD AUTO: 9.4 FL (ref 7.4–10.4)
PMV BLD AUTO: 9.7 FL (ref 7.4–10.4)
POTASSIUM SERPL-SCNC: 3.5 MMOL/L (ref 3.5–5.1)
PROT SERPL-MCNC: 6 GM/DL (ref 6.4–8.3)
PROTEUS SPP. (OHS): NOT DETECTED
PSEUDOMONAS AERUGINOSA (OHS): NOT DETECTED
RBC # BLD AUTO: 3.53 X10(6)/MCL (ref 4.2–5.4)
RBC # BLD AUTO: 3.56 X10(6)/MCL (ref 4.2–5.4)
S ENT+BONG DNA STL QL NAA+NON-PROBE: NOT DETECTED
S PNEUM DNA CSF QL NAA+NON-PROBE: NOT DETECTED
SERRATIA MARCESCENS (OHS): NOT DETECTED
SODIUM SERPL-SCNC: 138 MMOL/L (ref 136–145)
STAPHYLOCOCCUS AUREUS (OHS): NOT DETECTED
STAPHYLOCOCCUS EPIDERMIDIS (OHS): NOT DETECTED
STAPHYLOCOCCUS LUGDUNENSIS (OHS): NOT DETECTED
STAPHYLOCOCCUS SPP. (OHS): NOT DETECTED
STENOTROPHOMONAS MALTOPHILIA (OHS): NOT DETECTED
STREPTOCOCCUS PYOGENES (GROUP A)(OHS): NOT DETECTED
STREPTOCOCCUS SPP. (OHS): NOT DETECTED
VANA/B (OHS): NORMAL
VIM (OHS): NORMAL
WBC # SPEC AUTO: 21.53 X10(3)/MCL (ref 4.5–11.5)
WBC # SPEC AUTO: 21.82 X10(3)/MCL (ref 4.5–11.5)

## 2024-03-16 PROCEDURE — 63600175 PHARM REV CODE 636 W HCPCS: Mod: JZ,JG

## 2024-03-16 PROCEDURE — 96366 THER/PROPH/DIAG IV INF ADDON: CPT | Mod: 59

## 2024-03-16 PROCEDURE — 25000003 PHARM REV CODE 250

## 2024-03-16 PROCEDURE — G0378 HOSPITAL OBSERVATION PER HR: HCPCS

## 2024-03-16 PROCEDURE — 85027 COMPLETE CBC AUTOMATED: CPT | Mod: 91 | Performed by: STUDENT IN AN ORGANIZED HEALTH CARE EDUCATION/TRAINING PROGRAM

## 2024-03-16 PROCEDURE — 25000003 PHARM REV CODE 250: Performed by: STUDENT IN AN ORGANIZED HEALTH CARE EDUCATION/TRAINING PROGRAM

## 2024-03-16 PROCEDURE — 96361 HYDRATE IV INFUSION ADD-ON: CPT

## 2024-03-16 PROCEDURE — 80053 COMPREHEN METABOLIC PANEL: CPT

## 2024-03-16 PROCEDURE — 84100 ASSAY OF PHOSPHORUS: CPT

## 2024-03-16 PROCEDURE — 96368 THER/DIAG CONCURRENT INF: CPT

## 2024-03-16 PROCEDURE — 85025 COMPLETE CBC W/AUTO DIFF WBC: CPT

## 2024-03-16 PROCEDURE — 83735 ASSAY OF MAGNESIUM: CPT

## 2024-03-16 PROCEDURE — 94761 N-INVAS EAR/PLS OXIMETRY MLT: CPT

## 2024-03-16 PROCEDURE — 96367 TX/PROPH/DG ADDL SEQ IV INF: CPT | Mod: 59

## 2024-03-16 RX ORDER — MAGNESIUM SULFATE HEPTAHYDRATE 40 MG/ML
2 INJECTION, SOLUTION INTRAVENOUS
Status: COMPLETED | OUTPATIENT
Start: 2024-03-16 | End: 2024-03-16

## 2024-03-16 RX ORDER — METRONIDAZOLE 500 MG/1
500 TABLET ORAL EVERY 12 HOURS
Qty: 28 TABLET | Refills: 0 | Status: SHIPPED | OUTPATIENT
Start: 2024-03-16 | End: 2024-03-30

## 2024-03-16 RX ORDER — SENNOSIDES 8.6 MG/1
2 TABLET ORAL DAILY
Qty: 28 TABLET | Refills: 0 | Status: SHIPPED | OUTPATIENT
Start: 2024-03-16 | End: 2024-03-30

## 2024-03-16 RX ORDER — ONDANSETRON 4 MG/1
4 TABLET, ORALLY DISINTEGRATING ORAL EVERY 6 HOURS PRN
Qty: 30 TABLET | Refills: 0 | Status: SHIPPED | OUTPATIENT
Start: 2024-03-16

## 2024-03-16 RX ORDER — GABAPENTIN 300 MG/1
300 CAPSULE ORAL 3 TIMES DAILY
Qty: 90 CAPSULE | Refills: 11 | Status: SHIPPED | OUTPATIENT
Start: 2024-03-16 | End: 2024-05-17

## 2024-03-16 RX ORDER — DOCUSATE SODIUM 100 MG/1
100 CAPSULE, LIQUID FILLED ORAL DAILY
Qty: 30 CAPSULE | Refills: 0 | Status: SHIPPED | OUTPATIENT
Start: 2024-03-16 | End: 2024-04-15

## 2024-03-16 RX ORDER — OXYCODONE HYDROCHLORIDE 5 MG/1
10 TABLET ORAL EVERY 4 HOURS PRN
Status: DISCONTINUED | OUTPATIENT
Start: 2024-03-16 | End: 2024-03-16 | Stop reason: HOSPADM

## 2024-03-16 RX ORDER — DOXYCYCLINE 100 MG/1
100 CAPSULE ORAL 2 TIMES DAILY
Qty: 28 CAPSULE | Refills: 0 | Status: SHIPPED | OUTPATIENT
Start: 2024-03-16 | End: 2024-03-30

## 2024-03-16 RX ADMIN — LISINOPRIL 10 MG: 10 TABLET ORAL at 08:03

## 2024-03-16 RX ADMIN — METRONIDAZOLE 500 MG: 5 INJECTION, SOLUTION INTRAVENOUS at 03:03

## 2024-03-16 RX ADMIN — POLYETHYLENE GLYCOL 3350 17 G: 17 POWDER, FOR SOLUTION ORAL at 08:03

## 2024-03-16 RX ADMIN — MAGNESIUM SULFATE HEPTAHYDRATE 2 G: 40 INJECTION, SOLUTION INTRAVENOUS at 10:03

## 2024-03-16 RX ADMIN — DOXYCYCLINE 100 MG: 100 INJECTION, POWDER, LYOPHILIZED, FOR SOLUTION INTRAVENOUS at 05:03

## 2024-03-16 RX ADMIN — MAGNESIUM SULFATE HEPTAHYDRATE 2 G: 40 INJECTION, SOLUTION INTRAVENOUS at 12:03

## 2024-03-16 RX ADMIN — MEDROXYPROGESTERONE ACETATE 20 MG: 10 TABLET ORAL at 11:03

## 2024-03-16 RX ADMIN — DOCUSATE SODIUM 100 MG: 100 CAPSULE, LIQUID FILLED ORAL at 08:03

## 2024-03-16 RX ADMIN — ACETAMINOPHEN 650 MG: 325 TABLET, FILM COATED ORAL at 11:03

## 2024-03-16 RX ADMIN — POTASSIUM BICARBONATE 40 MEQ: 391 TABLET, EFFERVESCENT ORAL at 11:03

## 2024-03-16 RX ADMIN — CEFTRIAXONE SODIUM 1 G: 1 INJECTION, POWDER, FOR SOLUTION INTRAMUSCULAR; INTRAVENOUS at 08:03

## 2024-03-16 RX ADMIN — ACETAMINOPHEN 650 MG: 325 TABLET, FILM COATED ORAL at 12:03

## 2024-03-16 RX ADMIN — METRONIDAZOLE 500 MG: 5 INJECTION, SOLUTION INTRAVENOUS at 10:03

## 2024-03-16 RX ADMIN — SODIUM CHLORIDE 75 MG: 0.9 INJECTION, SOLUTION INTRAVENOUS at 09:03

## 2024-03-16 RX ADMIN — HYDROCHLOROTHIAZIDE 12.5 MG: 12.5 TABLET ORAL at 08:03

## 2024-03-16 RX ADMIN — HYDROCODONE BITARTRATE AND ACETAMINOPHEN 1 TABLET: 5; 325 TABLET ORAL at 05:03

## 2024-03-16 RX ADMIN — MUPIROCIN: 20 OINTMENT TOPICAL at 11:03

## 2024-03-16 NOTE — PROGRESS NOTES
Progress Note  Gynecology    Admit Date: 3/14/2024  LOS: 0    Reason for Admission:  Vaginal mass    SUBJECTIVE:     Elli Wu is a 44 y.o.  who initially presented in hypertensive emergency, [place], AUB, urinary retention 2/2 vaginal mass and was admitted for management of hypertensive emergency with medicine, which has been managed with re-initiation of home medications. She is now admitted to gynecology service in the setting vaginal mass of unknown origin with mass effect causing urinary retention. She is now POD #1, s/p EUA, biopsy of mass, and vaginoscopy.     She states she is doing well. She reports pain is well controlled on current pain regimen, required 1 norco 5mg this AM for pain. She is tolerating diabetic diet without any nausea or vomiting. She has been able to clean herself and get up to use restroom. Ambulating to restroom within room, not yet ambulating in roy. She reports improved vaginal bleeding, changing her pad once overnight, which was 50% saturated.     OBJECTIVE:     Vital Signs   Temp:  [97 °F (36.1 °C)-97.9 °F (36.6 °C)] 97.7 °F (36.5 °C)  Pulse:  [88-99] 92  Resp:  [16-20] 17  SpO2:  [98 %-100 %] 99 %  BP: (140-182)/(72-99) 144/80      Intake/Output Summary (Last 24 hours) at 3/16/2024 0803  Last data filed at 3/16/2024 0700  Gross per 24 hour   Intake 2287.58 ml   Output 3500 ml   Net -1212.42 ml       Physical Exam:  General:   alert, appears stated age, cooperative, and in no acute distress   Lungs:   clear to auscultation bilaterally   Heart:   regular rate and rhythm   Abdomen:  Soft, non-distended, mild lower abdominal tenderness, no rbeound or guarding   Pelvis: 25% blood on pad, with john in place   Extremities: peripheral pulses normal, no pedal edema, no clubbing or cyanosis       Laboratory:  Recent Results (from the past 24 hour(s))   HCG, Quantitative    Collection Time: 03/15/24  9:13 AM   Result Value Ref Range    Beta Human Chorionic Gonadotropin  Quantitative <2.42 <=5.00 mIU/mL   Light Green Top Hold    Collection Time: 03/15/24  9:13 AM   Result Value Ref Range    Extra Tube Hold for add-ons.    Lavender Top Hold    Collection Time: 03/15/24  9:13 AM   Result Value Ref Range    Extra Tube Hold for add-ons.    Gold Top Hold    Collection Time: 03/15/24  9:13 AM   Result Value Ref Range    Extra Tube Hold for add-ons.    HCG Qualitative Urine    Collection Time: 03/15/24  9:13 AM   Result Value Ref Range    Beta hCG Qualitative, Urine Negative Negative   Comprehensive Metabolic Panel (CMP)    Collection Time: 03/16/24  3:17 AM   Result Value Ref Range    Sodium Level 138 136 - 145 mmol/L    Potassium Level 3.5 3.5 - 5.1 mmol/L    Chloride 106 98 - 107 mmol/L    Carbon Dioxide 23 22 - 29 mmol/L    Glucose Level 181 (H) 74 - 100 mg/dL    Blood Urea Nitrogen 12.7 7.0 - 18.7 mg/dL    Creatinine 0.93 0.55 - 1.02 mg/dL    Calcium Level Total 8.3 (L) 8.4 - 10.2 mg/dL    Protein Total 6.0 (L) 6.4 - 8.3 gm/dL    Albumin Level 2.1 (L) 3.5 - 5.0 g/dL    Globulin 3.9 (H) 2.4 - 3.5 gm/dL    Albumin/Globulin Ratio 0.5 (L) 1.1 - 2.0 ratio    Bilirubin Total 0.2 <=1.5 mg/dL    Alkaline Phosphatase 88 40 - 150 unit/L    Alanine Aminotransferase <5 0 - 55 unit/L    Aspartate Aminotransferase 6 5 - 34 unit/L    eGFR >60 mls/min/1.73/m2   Magnesium    Collection Time: 03/16/24  3:17 AM   Result Value Ref Range    Magnesium Level 1.30 (L) 1.60 - 2.60 mg/dL   Phosphorus    Collection Time: 03/16/24  3:17 AM   Result Value Ref Range    Phosphorus Level 2.7 2.3 - 4.7 mg/dL   CBC Without Differential    Collection Time: 03/16/24  3:17 AM   Result Value Ref Range    WBC 21.82 (H) 4.50 - 11.50 x10(3)/mcL    RBC 3.56 (L) 4.20 - 5.40 x10(6)/mcL    Hgb 8.5 (L) 12.0 - 16.0 g/dL    Hct 27.0 (L) 37.0 - 47.0 %    MCV 75.8 (L) 80.0 - 94.0 fL    MCH 23.9 (L) 27.0 - 31.0 pg    MCHC 31.5 (L) 33.0 - 36.0 g/dL    RDW 20.4 (H) 11.5 - 17.0 %    Platelet 450 (H) 130 - 400 x10(3)/mcL    MPV 9.7 7.4 -  10.4 fL    NRBC% 0.0 %   CBC with Differential    Collection Time: 24  3:17 AM   Result Value Ref Range    WBC 21.53 (H) 4.50 - 11.50 x10(3)/mcL    RBC 3.53 (L) 4.20 - 5.40 x10(6)/mcL    Hgb 8.4 (L) 12.0 - 16.0 g/dL    Hct 26.5 (L) 37.0 - 47.0 %    MCV 75.1 (L) 80.0 - 94.0 fL    MCH 23.8 (L) 27.0 - 31.0 pg    MCHC 31.7 (L) 33.0 - 36.0 g/dL    RDW 20.5 (H) 11.5 - 17.0 %    Platelet 426 (H) 130 - 400 x10(3)/mcL    MPV 9.4 7.4 - 10.4 fL    Neut % 87.9 %    Lymph % 6.5 %    Mono % 4.6 %    Eos % 0.0 %    Basophil % 0.2 %    Lymph # 1.39 0.6 - 4.6 x10(3)/mcL    Neut # 18.92 (H) 2.1 - 9.2 x10(3)/mcL    Mono # 1.00 0.1 - 1.3 x10(3)/mcL    Eos # 0.01 0 - 0.9 x10(3)/mcL    Baso # 0.04 <=0.2 x10(3)/mcL    IG# 0.17 (H) 0 - 0.04 x10(3)/mcL    IG% 0.8 %    NRBC% 0.0 %       Diagnostic Results:  Blood culture x1: Gram positive cocci probable staphylococcus    ASSESSMENT/PLAN:     Active Hospital Problems    Diagnosis  POA    *Vaginal mass [N89.8]  Yes    Abnormal uterine bleeding [N93.9]  Yes      Resolved Hospital Problems    Diagnosis Date Resolved POA    Hypertensive emergency [I16.1] 03/15/2024 Yes       Assessment:     Elli Wu is a 44 y.o.  who initially presented in hypertensive emergency, [place], [place], urinary retention 2/2 vaginal mass and was admitted for management of hypertensive emergency with medicine, which has been managed with re-initiation of home medications. She is now admitted to gynecology service in the setting vaginal mass of unknown origin with mass effect causing urinary retention. She is now POD #1, s/p EUA, biopsy of mass, and vaginoscopy. Hemodynamically stable.    Plan:   1. -  Vaginal mass/AUB   - Patient with decreased bleeding   - S/p EUA, mass biopsy and vaginoscopy: with difficulty identifying origin of mass, liklely not vaginal, given normal vaginal mucosa, however unable to differentiate between mass and cervical tissue. No stalk confidently identified.    -- IOFS resulted as  possible fibroid  -- Pending final pathology  - Continue provera 20 mg daily    2. CV:   - Hemodynamically stable   - Not tachycardic, not hypotensive.   - IM following. BP Better controlled on home lisinopril and HCTZ daily    3. Pulm:   - Satting well on RA    4. GI/FEN:   - Diabetic diet   - Tolerating well without any nausea/vomiting   - Will replete electrolytes as needed      5. ID:   - Afebrile, not tachycardic, or hypotensive  - Leukocytosis on admission of 14, now elevated to 21 today   - Blood cultures 1/2 with possible staph  -- BCID pending for final results.   -- Discussed with IM, if positive, will switch to vancomycin, if negative, can continue with current abx regimen with Doxy/flagyl/rocephin   - Urine culture: no growth x24 hrs    6. Heme:  - S/p total 3 units of pRBCs   - H/H stable today at 8.5/27  - Vaginal bleeding decreased, will continue to monitor.    7. SCDs for DVT prophylaxis    Patient and plan were discussed with Dr. Oliver.    Igor Feldman MD PGY3  Obstetrics & Gynecology   03/16/2024    Aware/agree w above-[facility]

## 2024-03-16 NOTE — PROGRESS NOTES
[facility] Progress Note     Resident Team: [facility]     Subjective:      Brief HPI:  Elli Wu is a 44 y.o. female with a PMHx of HTN, uterine leiomyoma, who presented to [facility] ED on 3/14/2024  with a primary complaint of pelvic pain and vaginal bleeding. Patient was admitted to medicine service with hypertensive emergency, also with AUB and uterine versus cervical mass, concern for prolapsed fibroid.     Hospital Course/Significant events:  24 - s/p 2 unit pRBC transfusion  03/15/24 - transfer of care to GYN as primary; Internal medicine will remain as consult for BP management  03/15/24 - EUA, biopsy of mass, and vaginoscopy; received 1 unit pRBC after procedure    Interval History:   Nurse reports of no acute overnight events. POD s/p day 1 of EUA, biopsy of mass, and vaginoscopy. Patient received 1 additional unit of pRBC after procedure. This AM H/H improved from 7.4/23.8 to8.4/26.5. BP stable with resuming home antihypertensives (Lisinopril 10 mg/HCTZ 12.5 mg). Patient states pain is well controlled and tolerating PO diet at this time. No complaints. Denies fever, chills, lightheadedness, chest pain, palpitations, shortness of breath, nausea/vomiting but admits to pelvic pain.    Review of Systems:  ROS  - see HPI  Objective:     Last 24 Hour Vital Signs:  BP  Min: 140/83  Max: 182/98  Temp  Av.6 °F (36.4 °C)  Min: 97 °F (36.1 °C)  Max: 97.9 °F (36.6 °C)  Pulse  Av.4  Min: 88  Max: 99  Resp  Av.3  Min: 16  Max: 20  SpO2  Av.5 %  Min: 98 %  Max: 100 %  I/O last 3 completed shifts:  In: 3674.7 [P.O.:2102; I.V.:701; Blood:689.6; IV Piggyback:182.1]  Out: 7100 [Urine:7100]    Physical Examination:  Physical Exam  Constitutional:       Appearance: Normal appearance.   HENT:      Head: Normocephalic.      Mouth/Throat:      Mouth: Mucous membranes are moist.      Pharynx: Oropharynx is clear.   Eyes:      Extraocular Movements: Extraocular movements intact.       Conjunctiva/sclera: Conjunctivae normal.      Pupils: Pupils are equal, round, and reactive to light.   Cardiovascular:      Rate and Rhythm: Normal rate and regular rhythm.      Pulses: Normal pulses.      Heart sounds: Normal heart sounds.   Pulmonary:      Effort: Pulmonary effort is normal.      Breath sounds: Normal breath sounds.   Abdominal:      General: Abdomen is flat. Bowel sounds are normal. There is no distension.      Palpations: Abdomen is soft.      Tenderness: There is abdominal tenderness (mild tenderness in suprapublic area; uterus non-palable; improved compared to yesterday).   Musculoskeletal:         General: Normal range of motion.      Cervical back: Normal range of motion and neck supple.   Skin:     General: Skin is warm.      Capillary Refill: Capillary refill takes less than 2 seconds.   Neurological:      General: No focal deficit present.      Mental Status: She is alert and oriented to person, place, and time.           Laboratory:  Most Recent Data:  CBC:   Lab Results   Component Value Date    WBC 21.82 (H) 03/16/2024    WBC 21.53 (H) 03/16/2024    HGB 8.5 (L) 03/16/2024    HGB 8.4 (L) 03/16/2024    HCT 27.0 (L) 03/16/2024    HCT 26.5 (L) 03/16/2024     (H) 03/16/2024     (H) 03/16/2024    MCV 75.8 (L) 03/16/2024    MCV 75.1 (L) 03/16/2024    RDW 20.4 (H) 03/16/2024    RDW 20.5 (H) 03/16/2024     WBC Differential:   Recent Labs   Lab 03/14/24  1401 03/15/24  0420 03/16/24  0317   WBC 14.07* 14.57* 21.82*  21.53*   HGB 6.8* 7.4* 8.5*  8.4*   HCT 23.3* 23.8* 27.0*  26.5*    448* 450*  426*   MCV 70.4* 72.1* 75.8*  75.1*       BMP:   Lab Results   Component Value Date     03/16/2024    K 3.5 03/16/2024    CO2 23 03/16/2024    BUN 12.7 03/16/2024    CREATININE 0.93 03/16/2024    CALCIUM 8.3 (L) 03/16/2024    MG 1.30 (L) 03/16/2024    PHOS 2.7 03/16/2024     Thyroid:   Lab Results   Component Value Date    TSH 0.959 03/14/2024      Anemia:   Lab Results    Component Value Date    IRON 8 (L) 03/14/2024    TIBC 207 (L) 03/14/2024    FERRITIN 124.24 03/14/2024     Microbiology Data:  Microbiology Results (last 7 days)       Procedure Component Value Units Date/Time    BCID2 Panel [[id number]]  (Normal) Collected: 03/14/24 1630    Order Status: Completed Specimen: Blood from Hand, Left Updated: 03/16/24 0822     CTX-M (ESBL ) N/A     IMP (Cabapenemase ) N/A     KPC resistance gene (Carbapenemase ) N/A     mcr-1 N/A     mecA ID N/A     Comment: Note: Antimicrobial resistance can occur via multiple mechanisms. A Not Detected result for antimicrobial resistance gene(s) does not indicate antimicrobial susceptibility. Subculturing is required for species identification and susceptibility testing of   isolates.        mecA/C and MREJ (MRSA) gene N/A     NDM (Carbapenemase ) N/A     OXA-48-like (Carbapenemase ) N/A     Dorcas/B (VRE gene) N/A     VIM (Carbapenemase ) N/A     Enterococcus faecalis Not Detected     Enterococcus faecium Not Detected     Listeria monocytogenes Not Detected     Staphylococcus spp. Not Detected     Staphylococcus aureus Not Detected     Staphylococcus epidermidis Not Detected     Staphylococcus lugdunensis Not Detected     Streptococcus spp. Not Detected     Streptococcus agalactiae (Group B) Not Detected     Streptococcus pneumoniae Not Detected     Streptococcus pyogenes (Group A) Not Detected     Acinetobacter calcoaceticus/baumannii complex Not Detected     Bacteroides fragilis Not Detected     Enterobacterales Not Detected     Enterobacter cloacae complex Not Detected     Escherichia coli Not Detected     Klebsiella aerogenes Not Detected     Klebsiella oxytoca Not Detected     Klebsiella pneumoniae group Not Detected     Proteus spp. Not Detected     Salmonella spp. Not Detected     Serratia marcescens Not Detected     Haemophilus influenzae Not Detected     Neisseria meningitidis Not Detected      Pseudomonas aeruginosa Not Detected     Stenotrophomonas maltophilia Not Detected     Candida albicans Not Detected     Candida auris Not Detected     Candida glabrata Not Detected     Candida krusei Not Detected     Candida parapsilosis Not Detected     Candida tropicalis Not Detected     Cryptococcus neoformans/gattii Not Detected    Narrative:      The [employer] BCID2 Panel is a multiplexed nucleic acid test intended for the use with [employer]® 2.0 or [employer]® [employer] for the simultaneous qualitative detection and identification of multiple bacterial and yeast nucleic acids and select genetic determinants associated with antimicrobial resistance.  The [employer] BCID2 Panel test is performed directly on blood culture samples identified as positive by a continuous monitoring blood culture system.  Results are intended to be interpreted in conjunction with Gram stain results.    Blood culture x two cultures. Draw prior to antibiotics. [[id number]]  (Abnormal) Collected: 03/14/24 1630    Order Status: Completed Specimen: Blood from Hand, Left Updated: 03/16/24 0653     GRAM STAIN Gram Positive Cocci, probable Staphylococcus      Seen in gram stain of broth only      1 of 1 Aerobic bottle positive    Blood culture x two cultures. Draw prior to antibiotics. [[id number]]  (Normal) Collected: 03/14/24 1611    Order Status: Completed Specimen: Blood from Antecubital, Right Updated: 03/15/24 2200     CULTURE, BLOOD ([facility]) No Growth At 24 Hours    Urine culture [[id number]] Collected: 03/14/24 1340    Order Status: Completed Specimen: Urine Updated: 03/15/24 0634     Urine Culture No Growth At 24 Hours           Radiology:  Imaging Results              CT Abdomen Pelvis  Without Contrast (Final result)  Result time 03/15/24 10:01:24      Final result by Crescencio Horton MD (03/15/24 10:01:24)                   Impression:      No abnormality seen    1. There is mild prominence of the bilateral renal  collecting system down to the visualized proximal ureters without an obstructing stone in the collecting system or ureter identified. The distal ureters are not adequately visualized likely compressed by the large pelviabdominal mass as below.    2. The uterus is enlarged. Two heterogeneous predominantly hypodense masses are seen in the posterosuperior and posteroinferior portion of the uterus measuring approximately 71.1 x 5.5 cm (series 8 image 82) and 10.5 x 93.7 cm (series 8 image 79), respectively. There is tiny hyperdense focus in the vaginal region (series 2 image 148 and series 8 image 75) which may reflect blood products. In addition, the aforementioned masses appear to mildly compress the rectum and sigmoid colon posteriorly. These may represent uterine leiomyomas. Correlate with clinical and laboratory findings as regards additional evaluation such as ultrasound. Minimal fluid is seen within the endometrium.    3. Details and other findings as discussed above.    There is concurrence with the preliminary report.  There is an additional finding of some blood in the vaginal vault and some air and fluid in the vaginal wall likely consistent with blood.      Electronically signed by: Leo Horton  Date:    03/15/2024  Time:    10:01               Narrative:    EXAMINATION:  CT ABDOMEN PELVIS WITHOUT CONTRAST    CLINICAL HISTORY:  Metastatic disease evaluation;    TECHNIQUE:  Low dose axial images, sagittal and coronal reformations were obtained from the lung bases to the pubic symphysis.  No contrast was administered.  Automatic exposure control is utilized to reduce patient radiation exposure.    COMPARISON:  None    FINDINGS:  Lines and Tubes: None    Thorax:Lungs: The visualized lung bases appear unremarkable.    Heart: The heart size is within normal limits.    Abdomen:Abdominal Wall: No abdominal wall pathology is seen    Liver: The liver appears unremarkable.    Biliary System: No intrahepatic or  extrahepatic biliary duct dilatation is seen.    Gallbladder: Multiple varisized gallstones are seen in the gallbladder which otherwise appears unremarkable. Correlate with clinical and laboratory findings as regards further evaluation and follow-up.    Pancreas: The pancreas appears unremarkable    Adrenals: The adrenal glands appear unremarkable.    Kidneys: Note of tiny scattered calcifications bilaterally which may reflect tiny nonobstructive stones. There is mild prominence of the bilateral renal collecting system down to the visualized proximal ureters without an obstructing stone in the collecting system or ureter identified. The distal ureters are not adequately visualized likely compressed by the large pelviabdominal mass as below    Aorta: The abdominal aorta appears unremarkable.    IVC: Unremarkable.    Bowel:Esophagus: The visualized esophagus appears unremarkable.    Stomach: The stomach appears unremarkable.    Duodenum: Unremarkable appearing duodenum.    Small Bowel: The small bowel appears unremarkable.    Colon: Nondistended.    Appendix: The appendix appears unremarkable and is seen on Image 68, Series 6 through Image 80, Series 6.    Peritoneum: No intraperitoneal free air or ascites is seen.    Pelvis:Bladder: The bladder is markedly distended otherwise unremarkable.    Female:Uterus: The uterus is enlarged. Two heterogeneous predominantly hypodense masses are seen in the posterosuperior and posteroinferior portion of the uterus measuring approximately 71.1 x 5.5 cm (series 8 image 82) and 10.5 x 93.7 cm (series 8 image 79), respectively. There is tiny hyperdense focus in the vaginal region (series 2 image 148 and series 8 image 75) which may reflect blood products. In addition, the aforementioned masses appear to mildly compress the rectum and sigmoid colon posteriorly. Minimal fluid is seen within the endometrium    Ovaries: No adnexal masses are seen.    Bony structures:Dorsal Spine: The  visualized dorsal spine appears unremarkable    Bony Pelvis: The visualized bony structures of the pelvis appear unremarkable.                        Preliminary result by Wilian West Jr., MD (03/14/24 21:01:42)                   Impression:    1. There is mild prominence of the bilateral renal collecting system down to the visualized proximal ureters without an obstructing stone in the collecting system or ureter identified. The distal ureters are not adequately visualized likely compressed by the large pelvoabdominal mass as below.  2. The uterus is enlarged. Two heterogeneous predominanly hypodense masses are seen in the posterosuperior and posteroinferior portion of the uterus measuring approximately 71.1 x 5.5 cm (series 8 image 82) and 10.5 x 93.7 cm (series 8 image 79), respectively. There is tiny hyperdense focus in the vaginal region (series 2 image 148 and series 8 image 75) which may reflect blood products. In addition, the aforementioned masses appear to mildly compress the rectum and sigmoid colon posteriorly. These may represent uterine leiomyomas. Correlate with clinical and laboratory findings as regards additional evaluation such as ultrasound. Minimal fluid is seen within the endometrium.  3. Details and other findings as discussed above.               Narrative:    START OF REPORT:  Technique: CT of the abdomen and pelvis was performed with axial images as well as sagittal and coronal reconstruction images without intravenous contrast.    Comparison: None available.    Clinical History: Vaginal Bleeding (PT REPORTS HEAVY VAG BLEEDING W CLOTS W LOWER LOWER ABD PAIN AND NAUSEA SINCE YESTERDAY. RECENT D&C 2/20/24 W DX OF FIBROID. CO URINARY HESITANCY, NO DYSURIA. HX OF HTN, DID NOT TAKE BP MED. X 2 WKS.    Dosage Information: Automated Exposure Control was utilized.    Findings:  Lines and Tubes: None.  Thorax:  Lungs: The visualized lung bases appear unremarkable.  Heart: The heart size is  within normal limits.  Abdomen:  Abdominal Wall: No abdominal wall pathology is seen.  Liver: The liver appears unremarkable.  Biliary System: No intrahepatic or extrahepatic biliary duct dilatation is seen.  Gallbladder: Multiple varisized gallstones are seen in the gallbladder which otherwise appears unremarkable. Correlate with clinical and laboratory findings as regards further evaluation and follow-up.  Pancreas: The pancreas appears unremarkable.  Adrenals: The adrenal glands appear unremarkable.  Kidneys: Note of tiny scattered calcifications bilaterally which may reflect tiny nonobstructive stones. There is mild prominence of the bilateral renal collecting system down to the visualized proximal ureters without an obstructing stone in the collecting system or ureter identified. The distal ureters are not adequately visualized likely compressed by the large pelvoabdominal mass as below.  Aorta: The abdominal aorta appears unremarkable.  IVC: Unremarkable.  Bowel:  Esophagus: The visualized esophagus appears unremarkable.  Stomach: The stomach appears unremarkable.  Duodenum: Unremarkable appearing duodenum.  Small Bowel: The small bowel appears unremarkable.  Colon: Nondistended.  Appendix: The appendix appears unremarkable and is seen on Image 68, Series 6 through Image 80, Series 6.  Peritoneum: No intraperitoneal free air or ascites is seen.    Pelvis:  Bladder: The bladder is markedly distended otherwise unremarkable.  Female:  Uterus: The uterus is enlarged. Two heterogeneous predominanly hypodense masses are seen in the posterosuperior and posteroinferior portion of the uterus measuring approximately 71.1 x 5.5 cm (series 8 image 82) and 10.5 x 93.7 cm (series 8 image 79), respectively. There is tiny hyperdense focus in the vaginal region (series 2 image 148 and series 8 image 75) which may reflect blood products. In addition, the aforementioned masses appear to mildly compress the rectum and sigmoid  colon posteriorly. Minimal fluid is seen within the endometrium.  Ovaries: No adnexal masses are seen.    Bony structures:  Dorsal Spine: The visualized dorsal spine appears unremarkable.  Bony Pelvis: The visualized bony structures of the pelvis appear unremarkable.                                         US Pelvis Comp with Transvag NON-OB (xpd (Final result)  Result time 03/14/24 16:08:23      Final result by Elif Mittal MD (03/14/24 16:08:23)                   Impression:      Abnormal, heterogeneous masslike enlargement of the cervix.  It is difficult to differentiate cervical mass from distension of the endocervical canal with blood products.      Electronically signed by: Elif Mittal  Date:    03/14/2024  Time:    16:08               Narrative:    EXAMINATION:  US PELVIS COMP WITH TRANSVAG NON-OB (XPD)    CLINICAL HISTORY:  vaginal bleeding;    TECHNIQUE:  Transabdominal sonography of the pelvis was performed, followed by transvaginal sonography to better evaluate the uterus and ovaries.    COMPARISON:  CT abdomen pelvis 10/18/2021    FINDINGS:  UTERUS/CERVIX:    Size: 21 x 12 x 7 cm.    Masses: Lower uterine segment obscured by shadowing on the transabdominal images.  Transvaginal imaging is suboptimal due to patient's inability to empty urinary bladder.  There is complex, heterogeneous echogenicity at the enlarged cervix measuring at least 8 cm.The area of interest is suboptimally visualized on transabdominal imaging due to shadowing of the pubic bone and on transvaginal imaging due to patient inability to adequately empty the urinary bladder.    Endometrium: Suboptimally evaluated, estimated to measure 22 mm trans abdominally..    RIGHT OVARY:    Size: 5 x 4 x 2 cm    Appearance: Normal sonographic appearance.    Vascular flow: Normal spectral waveforms.    LEFT OVARY:    Attempted visualization of the left ovary.  Left ovary not seen for unknown reason, possibly due to shadowing bowel gas  and/or body habitus.    FREE FLUID:    None                                      Current Medications:     Infusions:   lactated ringers 125 mL/hr at 03/15/24 1750        Scheduled:   0.9%  NaCl infusion (for blood administration)   Intravenous Once    acetaminophen  650 mg Oral Q6H    cefTRIAXone (Rocephin) IV (PEDS and ADULTS)  1 g Intravenous Q24H    docusate sodium  100 mg Oral BID    doxycycline IV (PEDS and ADULTS)  100 mg Intravenous Q12H    hydroCHLOROthiazide  12.5 mg Oral Daily    iron dextran (INFED) 75 mg in sodium chloride 0.9% 250 mL IVPB  75 mg Intravenous Once    lisinopriL  10 mg Oral Daily    magnesium sulfate IVPB  2 g Intravenous Q2H    medroxyPROGESTERone  20 mg Oral Daily    metronidazole  500 mg Intravenous Q8H    mupirocin   Nasal BID    polyethylene glycol  17 g Oral Daily    potassium bicarbonate  40 mEq Oral Once        PRN:  0.9%  NaCl infusion (for blood administration), 0.9%  NaCl infusion (for blood administration), acetaminophen, bisacodyL, dextrose 10%, dextrose 10%, diphenhydrAMINE, diphenhydrAMINE, famotidine, glucagon (human recombinant), glucose, glucose, hydrALAZINE, HYDROcodone-acetaminophen, HYDROmorphone, labetalol, melatonin, mupirocin, naloxone, ondansetron, oxyCODONE, oxyCODONE, sodium chloride 0.9%    Antibiotics and Day Number of Therapy:  Rocephin 1 g daily, Flagyl 500 mg q8hrs, Doxy 100 mg BID (start date 03/14/24) - day 2    Assessment & Plan:     AUB in setting of prolapsed fibroid s/p day 1 EUA, biopsy of mass, and vaginoscopy   Biopsy of mass sent to pathology; pending report  H/H this AM improved to 8.4/26.5 compared to 7.4/23.8 on 03/15/24  S/p 1 unit pRBC transfusion after procedure  Patient currently hemodynamically stable  On Provera 20 mg qd     HTN Emergency - resolved  BP 140s/80s after resuming home BP medications yesterday  Currently on lisinopril 10 mg qd and HCTZ 12.5 mg qd  Will monitor patient's BP and adjust moving forwards    ISRAEL - resolved  BUN/Cr  this AM improved to baseline 12.7/0.93 compared to 17.8/1.28 on 03/15/24  Currently on  mL/hr      UTI vs PID  Currently on Rocephin 1 g daily, Flagyl 500 mg q8hrs, Doxy 100 mg BID  Urine cultures neg at 24hrs; Blood cultures 1 out of 2 positive for Gram positive cocci (probable Staph)  Reflex BCID2 panel negative    Iron deficiency anemia  Ferritin 124.24  Iron 8; TIBC 207  Plan to give IV Iron today     Hypokalemia; Hypomagnesemia   K 3.5; repleted with 40 mEq potassium bicarb  Mg 1.3; plan to repleted with 4 g magnesium sulfate          CODE STATUS: Full Code  Access: Peripheral  Antibiotics: Rocephin 1 g daily, Flagyl 500 mg q8hrs, Doxy 100 mg BID  Diet: Diabetic  DVT Prophylaxis: none  GI Prophylaxis: none needed  Fluids: lactated Ringer's 125 ml/hr.     Disposition: s/p day 1 EUA, biopsy of mass, and vaginoscopy; plan to discharge today per GYN; patient has no BP medication at home and will need BP medications filled at her preferred pharmacy at [facility]      Suhas Barnard MD     [facility] Resident HO-2  03/16/2024

## 2024-03-16 NOTE — PLAN OF CARE
43 yo  admitted to Gyn service in the setting of necrotic vaginal mass of unknown origin s/p EUA, vaginoscopy and biopsy of mass with frozen section resulting as benign fibroid pending final pathology.    Vitals:    03/15/24 1528 03/15/24 1555 03/15/24 1636 03/15/24 1745   BP: (!) 150/89 (!) 150/89  (!) 160/92   BP Location:       Patient Position:       Pulse: 92 92 96 95   Resp: 18      Temp: 97.9 °F (36.6 °C)   97.8 °F (36.6 °C)   TempSrc:    Oral   SpO2: 100% 100% 100% 98%   Weight:       Height:         Called in RN, states patient is doing well. Pain is well controlled.  Vitals reviewed patient hypertensive, not tachycardic, afebrile.    Patient s/p 3rd unit of pRBCs.    Patient requested to talk to physician. Discussed case with patient and discussed pending final pathology. Will review plan with patient in more detail.    Igor Feldman MD PGY3  Obstetrics & Gynecology   03/15/2024     (Aware/agree w above-[facility])

## 2024-03-16 NOTE — DISCHARGE SUMMARY
[facility] Telemetry  Discharge Summary  Gynecology      Admit Date: 3/14/2024    Discharge Date and Time:  2024 9:00 AM    Attending Physician: Stephy Pierre MD     Discharge Provider: Brooks Oliver MD    Reason for Admission:   Elli Wu is a 44 y.o.  who initially presented in hypertensive emergency, ISRAEL, AUB, urinary retention 2/2 vaginal mass and was admitted for management of hypertensive emergency with medicine, which has been managed with re-initiation of home medications. Service transferred to Gynecologic service in the setting stable pressures and vaginal mass casuing urinary retention with abnormal uterine bleeding.    Procedures Performed:   Exam under anesthesia, with vaginoscopy and vaginal mass biopsy    Hospital Course (synopsis of major diagnoses, care, treatment, and services provided during the course of the hospital stay):   She was initially admitted due to hypertensive emergency with associated ISRAEL. She was re-initiated on her home regimen and noted improvement of her pressures. She was noted to have urinary retention with minimal dribbling on hospital day 1 and had a john placed and noted decompression of bladder. CT scan was performed showing enlarged bladder, enlarged uterus with multiple fibroids, unable to fully visualize origin of vaginal mass. She was initiated on provera 20 mg daily to help with bleeding. She underwent an exam under anesthesia, vaginoscopy, and biopsy of mass on HD #2, IOFS noted to be fibroid, pending final pathology. On HD #3. Was noted to have 1/2 blood cuylture positive for staph with BCID negative, reviewed with IM and discussed no need for change in antibiotic regimen. She received a total of 3 units of packs red blood cells throughout her stay with an appropriate rise. Hemoglobin/hematocrit at discharge 8.5/27.  Patient was deemed stable and ready for discharge, meeting all milestones for discharge on HD#3/POD  #1.    Significant Diagnostic Studies:   CTAP 3/14/24:  FINDINGS:  Lines and Tubes: None     Thorax:Lungs: The visualized lung bases appear unremarkable.     Heart: The heart size is within normal limits.     Abdomen:Abdominal Wall: No abdominal wall pathology is seen     Liver: The liver appears unremarkable.     Biliary System: No intrahepatic or extrahepatic biliary duct dilatation is seen.     Gallbladder: Multiple varisized gallstones are seen in the gallbladder which otherwise appears unremarkable. Correlate with clinical and laboratory findings as regards further evaluation and follow-up.     Pancreas: The pancreas appears unremarkable     Adrenals: The adrenal glands appear unremarkable.     Kidneys: Note of tiny scattered calcifications bilaterally which may reflect tiny nonobstructive stones. There is mild prominence of the bilateral renal collecting system down to the visualized proximal ureters without an obstructing stone in the collecting system or ureter identified. The distal ureters are not adequately visualized likely compressed by the large pelviabdominal mass as below     Aorta: The abdominal aorta appears unremarkable.     IVC: Unremarkable.     Bowel:Esophagus: The visualized esophagus appears unremarkable.     Stomach: The stomach appears unremarkable.     Duodenum: Unremarkable appearing duodenum.     Small Bowel: The small bowel appears unremarkable.     Colon: Nondistended.     Appendix: The appendix appears unremarkable and is seen on Image 68, Series 6 through Image 80, Series 6.     Peritoneum: No intraperitoneal free air or ascites is seen.     Pelvis:Bladder: The bladder is markedly distended otherwise unremarkable.     Female:Uterus: The uterus is enlarged. Two heterogeneous predominantly hypodense masses are seen in the posterosuperior and posteroinferior portion of the uterus measuring approximately 71.1 x 5.5 cm (series 8 image 82) and 10.5 x 93.7 cm (series 8 image 79),  respectively. There is tiny hyperdense focus in the vaginal region (series 2 image 148 and series 8 image 75) which may reflect blood products. In addition, the aforementioned masses appear to mildly compress the rectum and sigmoid colon posteriorly. Minimal fluid is seen within the endometrium     Ovaries: No adnexal masses are seen.     Bony structures:Dorsal Spine: The visualized dorsal spine appears unremarkable     Bony Pelvis: The visualized bony structures of the pelvis appear unremarkable.     Impression:     No abnormality seen     1. There is mild prominence of the bilateral renal collecting system down to the visualized proximal ureters without an obstructing stone in the collecting system or ureter identified. The distal ureters are not adequately visualized likely compressed by the large pelviabdominal mass as below.     2. The uterus is enlarged. Two heterogeneous predominantly hypodense masses are seen in the posterosuperior and posteroinferior portion of the uterus measuring approximately 71.1 x 5.5 cm (series 8 image 82) and 10.5 x 93.7 cm (series 8 image 79), respectively. There is tiny hyperdense focus in the vaginal region (series 2 image 148 and series 8 image 75) which may reflect blood products. In addition, the aforementioned masses appear to mildly compress the rectum and sigmoid colon posteriorly. These may represent uterine leiomyomas. Correlate with clinical and laboratory findings as regards additional evaluation such as ultrasound. Minimal fluid is seen within the endometrium.     3. Details and other findings as discussed above.     There is concurrence with the preliminary report.  There is an additional finding of some blood in the vaginal vault and some air and fluid in the vaginal wall likely consistent with blood.        Electronically signed by: Leo Horton  Date:                                            03/15/2024  Time:                                           10:01            Exam Ended: 03/14/24 21:01 CDT Last Resulted: 03/15/24 10:01 CDT           Final Diagnoses:   Principal Problem: Vaginal mass   Secondary Diagnoses:   Active Hospital Problems    Diagnosis  POA    *Vaginal mass [N89.8]  Yes    Abnormal uterine bleeding [N93.9]  Yes      Resolved Hospital Problems    Diagnosis Date Resolved POA    Hypertensive emergency [I16.1] 03/15/2024 Yes     Discharged Condition: stable    Disposition: Home or Self Care    Follow Up/Patient Instructions:   Plan to follow via telemedicine within the week.    Medications:  Reconciled Home Medications:      Medication List        START taking these medications      docusate sodium 100 MG capsule  Commonly known as: COLACE  Take 1 capsule (100 mg total) by mouth once daily.     doxycycline 100 MG Cap  Commonly known as: VIBRAMYCIN  Take 1 capsule (100 mg total) by mouth 2 (two) times daily. for 14 days     gabapentin 300 MG capsule  Commonly known as: NEURONTIN  Take 1 capsule (300 mg total) by mouth 3 (three) times daily.     hydroCHLOROthiazide 12.5 MG Tab  Commonly known as: HYDRODIURIL  Take 1 tablet (12.5 mg total) by mouth once daily.     lisinopriL 10 MG tablet  Take 1 tablet (10 mg total) by mouth once daily.     metroNIDAZOLE 500 MG tablet  Commonly known as: FlagyL  Take 1 tablet (500 mg total) by mouth every 12 (twelve) hours. for 14 days     ondansetron 4 MG Tbdl  Commonly known as: ZOFRAN-ODT  Take 1 tablet (4 mg total) by mouth every 6 (six) hours as needed (Nausea).     oxyCODONE-acetaminophen 5-325 mg per tablet  Commonly known as: PERCOCET  Take 1 tablet by mouth every 4 (four) hours as needed for Pain.     senna 8.6 mg tablet  Commonly known as: SENNA  Take 2 tablets by mouth once daily. for 14 days            Discharge Procedure Orders   Diet diabetic     Other restrictions (specify):   Order Comments: No driving today. No driving while taking narcotics. Nothing in the vagina until cleared by a physician. No heavy lifting  greater than 10lbs. No baths/swimming.     Notify your health care provider if you experience any of the following:  persistent nausea and vomiting or diarrhea     Notify your health care provider if you experience any of the following:  severe uncontrolled pain     Notify your health care provider if you experience any of the following:  redness, tenderness, or signs of infection (pain, swelling, redness, odor or green/yellow discharge around incision site)     Notify your health care provider if you experience any of the following:  difficulty breathing or increased cough     Notify your health care provider if you experience any of the following:  severe persistent headache     Notify your health care provider if you experience any of the following:  worsening rash     Notify your health care provider if you experience any of the following:  persistent dizziness, light-headedness, or visual disturbances     Notify your health care provider if you experience any of the following:  increased confusion or weakness     Notify your health care provider if you experience any of the following:   Order Comments: Call clinic or go to ED with fever >101, intractable nausea/vomiting with inability to tolerate PO, heavy vaginal bleeding soaking through mor klaus 1 pad per hour for more than 1 hour, pain refractory to medications     Patient and plan were discussed with Dr. Oliver.    Igor Feldman MD PGY3  Obstetrics & Gynecology   03/16/2024

## 2024-03-16 NOTE — PLAN OF CARE
Problem: Adult Inpatient Plan of Care  Goal: Plan of Care Review  Outcome: Ongoing, Progressing  Goal: Patient-Specific Goal (Individualized)  Outcome: Ongoing, Progressing  Goal: Absence of Hospital-Acquired Illness or Injury  Outcome: Ongoing, Progressing  Goal: Optimal Comfort and Wellbeing  Outcome: Ongoing, Progressing  Goal: Readiness for Transition of Care  Outcome: Ongoing, Progressing     Problem: Infection  Goal: Absence of Infection Signs and Symptoms  Outcome: Ongoing, Progressing     Problem: Skin Injury Risk Increased  Goal: Skin Health and Integrity  Outcome: Ongoing, Progressing     Problem: Pain Acute  Goal: Acceptable Pain Control and Functional Ability  Outcome: Ongoing, Progressing

## 2024-03-16 NOTE — PLAN OF CARE
Problem: Adult Inpatient Plan of Care  Goal: Plan of Care Review  Outcome: Met  Goal: Patient-Specific Goal (Individualized)  Outcome: Met  Goal: Absence of Hospital-Acquired Illness or Injury  Outcome: Met  Goal: Optimal Comfort and Wellbeing  Outcome: Met  Goal: Readiness for Transition of Care  Outcome: Met     Problem: Infection  Goal: Absence of Infection Signs and Symptoms  Outcome: Met     Problem: Skin Injury Risk Increased  Goal: Skin Health and Integrity  Outcome: Met     Problem: Pain Acute  Goal: Acceptable Pain Control and Functional Ability  Outcome: Met

## 2024-03-17 LAB
BACTERIA BLD CULT: ABNORMAL
GRAM STN SPEC: ABNORMAL

## 2024-03-18 ENCOUNTER — TELEPHONE (OUTPATIENT)
Dept: GYNECOLOGY | Facility: CLINIC | Age: 44
End: 2024-03-18
Payer: MEDICAID

## 2024-03-18 LAB
ABO + RH BLD: NORMAL
BLD PROD TYP BPU: NORMAL
BLOOD UNIT EXPIRATION DATE: NORMAL
BLOOD UNIT TYPE CODE: 600
BLOOD UNIT TYPE CODE: 6200
CROSSMATCH INTERPRETATION: NORMAL
DISPENSE STATUS: NORMAL
ESTROGEN SERPL-MCNC: NORMAL PG/ML
INSULIN SERPL-ACNC: NORMAL U[IU]/ML
LAB AP CLINICAL INFORMATION: NORMAL
LAB AP GROSS DESCRIPTION: NORMAL
LAB AP INTRA OP: NORMAL
LAB AP REPORT FOOTNOTES: NORMAL
T3RU NFR SERPL: NORMAL %
UNIT NUMBER: NORMAL

## 2024-03-18 NOTE — TELEPHONE ENCOUNTER
----- Message from Tiffanie العلي MA sent at 3/18/2024  2:38 PM CDT -----  Regarding: FW: Results    ----- Message -----  From: Garcia Velasco  Sent: 3/18/2024   2:10 PM CDT  To: [facility] Gynecology Clinical Support Staff  Subject: Results                                          The patient above called because she would like her results from her procedure last week. Please advise, Thanks.

## 2024-03-18 NOTE — TELEPHONE ENCOUNTER
Called pt to discuss results from biopsy of vaginal mass, see results below. Discussed recommendation for urgent surgery via hysterectomy. Discussed high risk for ureteral injury at time of surgery, thus may need intraoperative urology consult in event of ureteral transection. Given this recommend pt have surgery with Dr. Jenkins in [place] at [facility]. Pt given tentative date for Friday 3/22/24, provided address for hospital and informed pt she will receive call from GYN team in [place] to further coordinate surgery.     1. Vaginal mass, biopsy:  - Fragments of leiomyoma with extensive necrosis.  - No evidence of malignancy.     2. Vaginal mass, biopsy:  - Fragments of leiomyoma with necrosis.  - No evidence of malignancy.      Angela De La Cruz MD, MPH  [facility], PGY4

## 2024-03-19 ENCOUNTER — TELEPHONE (OUTPATIENT)
Dept: GYNECOLOGY | Facility: CLINIC | Age: 44
End: 2024-03-19
Payer: MEDICAID

## 2024-03-19 LAB — BACTERIA BLD CULT: NORMAL

## 2024-03-19 NOTE — TELEPHONE ENCOUNTER
Op report and pap for 2024 from Dr Mcnulty's office faxed to [facility] as requested per Dr De La Cruz ----- Message from Angela De La Cruz MD sent at 3/18/2024  3:37 PM CDT -----  Regarding: CT images to [facility]  Good afternoon,   Can one of you help push the CT and ultrasound images from 3/14/24 to [facility]? She is getting surgery on Friday with Dr. Jenkins. She'll also need the op note that is in media faxed over, and her pap smear records that should be coming from Dr. Mcnulty's office this afternoon. Thanks y'all!    Best,   Dr. De La Cruz

## 2024-03-22 ENCOUNTER — DOCUMENTATION ONLY (OUTPATIENT)
Dept: GYNECOLOGY | Facility: CLINIC | Age: 44
End: 2024-03-22
Payer: MEDICAID

## 2024-03-22 NOTE — PROGRESS NOTES
Pathology Conference      Surgery Date: 3/15/24   Patient: Elli Wu    Pre-Op Diagnosis: Vaginal mass, abnormal uterine bleeding, urinary retention, outlet obstruction   Procedure: Exam under anesthesia, vaginoscopy, vaginal mass biopsy     Path:    Intraoperative frozen section: possible fibroid   1. Vaginal mass, biopsy:   - Fragments of leiomyoma with extensive necrosis.   - No evidence of malignancy.   2. Vaginal mass, biopsy:   - Fragments of leiomyoma with necrosis.   - No evidence of malignancy.       Plan: Hysterectomy with Dr. Jenkins 3/21/2024     Patient and plan discussed with Dr. Provost Rafaela Simons MD  [facility], PGY-2

## 2024-04-05 ENCOUNTER — OFFICE VISIT (OUTPATIENT)
Dept: GYNECOLOGY | Facility: CLINIC | Age: 44
End: 2024-04-05
Payer: MEDICAID

## 2024-04-05 VITALS
HEART RATE: 103 BPM | HEIGHT: 64 IN | TEMPERATURE: 98 F | RESPIRATION RATE: 18 BRPM | DIASTOLIC BLOOD PRESSURE: 88 MMHG | WEIGHT: 187.19 LBS | BODY MASS INDEX: 31.96 KG/M2 | SYSTOLIC BLOOD PRESSURE: 132 MMHG | OXYGEN SATURATION: 100 %

## 2024-04-05 DIAGNOSIS — Z09 POSTOP CHECK: Primary | ICD-10-CM

## 2024-04-05 PROCEDURE — 99213 OFFICE O/P EST LOW 20 MIN: CPT | Mod: PBBFAC

## 2024-04-05 RX ORDER — ACETAMINOPHEN 500 MG
1000 TABLET ORAL
COMMUNITY
Start: 2024-03-24 | End: 2024-04-23

## 2024-04-05 NOTE — PROGRESS NOTES
"Subjective:      Postoperative Follow-up     Elli Wu is a 44 y.o.  who presents to the clinic 3 weeks status post total abdominal hysterectomy for  prolapsed uterine fibroid . Eating a regular diet without difficulty. Bowel movements are normal. Pain is controlled without any medications.    Patient's medications, allergies, past medical, surgical, social and family histories were reviewed and updated as appropriate.      Review of Systems  Pertinent items are noted in HPI.     PATHOLOGY:     UTERINE MASS  - Benign leiomyoma, associated with ulcerated squamous mucosa consistent with vaginal origin.    2.  UTERUS, CERVIX, BILATERAL FALLOPIAN TUBES, HYSTERECTOMY WITH BILATERAL SALPINGECTOMY   MYOMETRIUM    - Benign leiomyomata.   ENDOMETRIUM    - Early to mid secretory pattern endometrium.    - No evidence of endometrial hyperplasia or malignancy.   CERVIX    - Benign endocervical canal with no evidence of cervical epithelial dysplasia or malignancy.    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   FALLOPIAN TUBES     - No significant histopathologic abnormality.    Objective:      /88   Pulse 103   Temp 98.4 °F (36.9 °C)   Resp 18   Ht 5' 4" (1.626 m)   Wt 84.9 kg (187 lb 3.2 oz)   LMP 2024   SpO2 100%   BMI 32.13 kg/m²   General:  alert,appears stated age,cooperative   Abdomen: soft,bowel sounds active,non-tender   Incision:   healing well,no drainage,no erythema,no hernia,no seroma,no swelling,no dehiscence,incision well approximated       Assessment:       44 y.o.  s/p total abdominal hysterectomy doing well post-operatively. Staples removed today in clinic without issue.  Operative findings again reviewed. Pathology report discussed.  No diagnosis found.        Plan:   Wound Care  - Staples removed today in clinic. Covered with Steristrips.  - Keep wound clean and dry at home. Patient advised to return to clinic if there is any significant " bleeding or dehiscence noted.     1. Continue any current medications.  2. Wound care discussed.  3. Activity restrictions:  pelvic rest  4. Anticipated return to work: now.  5. Follow up: 6 weeks for further post-op evaluation.    Problem List Items Addressed This Visit    None     De Jon, MS3  [facility]    Doreen Jamil MD  [facility]

## 2024-04-09 ENCOUNTER — TELEPHONE (OUTPATIENT)
Dept: GYNECOLOGY | Facility: CLINIC | Age: 44
End: 2024-04-09
Payer: MEDICAID

## 2024-04-09 NOTE — TELEPHONE ENCOUNTER
Dr. Mckeon's office requesting records due to patient coming in tomorrow for an annual visit. Patient is seen at that office for her yearly exams.     Faxed over requested records to, [phone] Fax confirmation will be scanned into patients chart once received.

## 2024-04-29 ENCOUNTER — TELEPHONE (OUTPATIENT)
Dept: GYNECOLOGY | Facility: CLINIC | Age: 44
End: 2024-04-29
Payer: MEDICAID

## 2024-04-29 NOTE — TELEPHONE ENCOUNTER
patient called wanting a sooner appt states had sex at 5 weeks and the Dr told her to wait til 8 weeks now her vagina is hurting and it burns when she urinates.   Spoke to Dr Jamil, patient cannot come in today due to being out of town so I put her on for Wednesday at 9.  I instructed her to go to a nearest [facility] to have provider see her and she doesn't want to because she needs her vagina looked at.

## 2024-04-29 NOTE — TELEPHONE ENCOUNTER
We do not have a 9 AM slot so I had to overbook her at 8:45 AM this Wednesday.     ----- Message from Genna Christianson RN sent at 4/29/2024  8:47 AM CDT -----  Dr Jamil approved Wednesday at 9, had intercourse and is hurting/s/p JANNETH  ----- Message -----  From: Makayla Liriano  Sent: 4/29/2024   8:33 AM CDT  To: [facility] Gynecology Clinical Support Staff    Patient requesting a sooner appointment. Patient states having problems at her bottom area.

## 2024-05-01 ENCOUNTER — OFFICE VISIT (OUTPATIENT)
Dept: GYNECOLOGY | Facility: CLINIC | Age: 44
End: 2024-05-01
Payer: MEDICAID

## 2024-05-01 VITALS
HEIGHT: 64 IN | HEART RATE: 87 BPM | DIASTOLIC BLOOD PRESSURE: 105 MMHG | WEIGHT: 197.06 LBS | TEMPERATURE: 98 F | OXYGEN SATURATION: 99 % | BODY MASS INDEX: 33.64 KG/M2 | SYSTOLIC BLOOD PRESSURE: 165 MMHG

## 2024-05-01 DIAGNOSIS — I10 HYPERTENSION, UNSPECIFIED TYPE: ICD-10-CM

## 2024-05-01 DIAGNOSIS — Z90.710 S/P ABDOMINAL HYSTERECTOMY: ICD-10-CM

## 2024-05-01 DIAGNOSIS — Z09 POSTOPERATIVE EXAMINATION: Primary | ICD-10-CM

## 2024-05-01 DIAGNOSIS — R30.0 DYSURIA: ICD-10-CM

## 2024-05-01 PROBLEM — N93.9 ABNORMAL UTERINE BLEEDING: Status: RESOLVED | Noted: 2024-03-14 | Resolved: 2024-05-01

## 2024-05-01 PROBLEM — N89.8 VAGINAL MASS: Status: RESOLVED | Noted: 2024-03-15 | Resolved: 2024-05-01

## 2024-05-01 LAB
APPEARANCE UR: ABNORMAL
BACTERIA #/AREA URNS AUTO: ABNORMAL /HPF
BILIRUB UR QL STRIP.AUTO: NEGATIVE
C TRACH DNA SPEC QL NAA+PROBE: NOT DETECTED
CLUE CELLS VAG QL WET PREP: ABNORMAL
COLOR UR AUTO: YELLOW
GLUCOSE UR QL STRIP.AUTO: NORMAL
HYALINE CASTS #/AREA URNS LPF: ABNORMAL /LPF
KETONES UR QL STRIP.AUTO: NEGATIVE
LEUKOCYTE ESTERASE UR QL STRIP.AUTO: 250
MUCOUS THREADS URNS QL MICRO: ABNORMAL /LPF
N GONORRHOEA DNA SPEC QL NAA+PROBE: NOT DETECTED
NITRITE UR QL STRIP.AUTO: ABNORMAL
PH UR STRIP.AUTO: 6.5 [PH]
PROT UR QL STRIP.AUTO: ABNORMAL
RBC #/AREA URNS AUTO: ABNORMAL /HPF
RBC UR QL AUTO: ABNORMAL
SOURCE (OHS): NORMAL
SP GR UR STRIP.AUTO: 1.02 (ref 1–1.03)
SQUAMOUS #/AREA URNS LPF: ABNORMAL /HPF
T VAGINALIS VAG QL WET PREP: ABNORMAL
UROBILINOGEN UR STRIP-ACNC: NORMAL
WBC #/AREA URNS AUTO: ABNORMAL /HPF
WBC #/AREA VAG WET PREP: ABNORMAL
YEAST SPEC QL WET PREP: ABNORMAL

## 2024-05-01 PROCEDURE — 81001 URINALYSIS AUTO W/SCOPE: CPT

## 2024-05-01 PROCEDURE — 87210 SMEAR WET MOUNT SALINE/INK: CPT

## 2024-05-01 PROCEDURE — 99213 OFFICE O/P EST LOW 20 MIN: CPT | Mod: PBBFAC

## 2024-05-01 PROCEDURE — 87491 CHLMYD TRACH DNA AMP PROBE: CPT

## 2024-05-01 PROCEDURE — 87086 URINE CULTURE/COLONY COUNT: CPT

## 2024-05-01 PROCEDURE — 87591 N.GONORRHOEAE DNA AMP PROB: CPT

## 2024-05-01 RX ORDER — ACETAMINOPHEN 500 MG
500 TABLET ORAL EVERY 6 HOURS PRN
COMMUNITY
End: 2024-05-17

## 2024-05-01 RX ORDER — HYDROCHLOROTHIAZIDE 12.5 MG/1
12.5 TABLET ORAL DAILY
Qty: 30 TABLET | Refills: 2 | Status: SHIPPED | OUTPATIENT
Start: 2024-05-01 | End: 2024-05-17

## 2024-05-01 RX ORDER — LISINOPRIL 10 MG/1
10 TABLET ORAL DAILY
Qty: 30 TABLET | Refills: 2 | Status: SHIPPED | OUTPATIENT
Start: 2024-05-01 | End: 2024-07-30

## 2024-05-01 RX ORDER — NITROFURANTOIN 25; 75 MG/1; MG/1
100 CAPSULE ORAL 2 TIMES DAILY
Qty: 10 CAPSULE | Refills: 0 | Status: SHIPPED | OUTPATIENT
Start: 2024-05-01 | End: 2024-05-06

## 2024-05-01 NOTE — PROGRESS NOTES
"Subjective:      Postoperative Follow-up     Elli Wu is a 44 y.o.  who presents to the clinic 6 weeks status post s/p UAE with IR, JANNETH/BS/cystoscopy 3/21/24 for AUB-L, prolapsing cervical fibroid, acute anemia.     Patient reports she had intercourse about 5 weeks after surgery and is now having bladder pain with voiding and urinary frequency. Denies pain during intercourse, vaginal bleeding, vaginal discharge. Eating a regular diet without difficulty. Bowel movements are normal.     Patient BP high at visit today. Has been out of BP meds x2 weeks. Endorses some blurred vision.    Patient's medications, allergies, past medical, surgical, social and family histories were reviewed and updated as appropriate.      Review of Systems  Pertinent items are noted in HPI.     PATHOLOGY:      UTERINE MASS  - Benign leiomyoma, associated with ulcerated squamous mucosa consistent with vaginal origin.     2.  UTERUS, CERVIX, BILATERAL FALLOPIAN TUBES, HYSTERECTOMY WITH BILATERAL SALPINGECTOMY              MYOMETRIUM                          - Benign leiomyomata.              ENDOMETRIUM                          - Early to mid secretory pattern endometrium.                          - No evidence of endometrial hyperplasia or malignancy.              CERVIX                          - Benign endocervical canal with no evidence of cervical epithelial dysplasia or malignancy.                          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              FALLOPIAN TUBES                          - No significant histopathologic abnormality.     Objective:      BP (!) 165/105 (BP Location: Right arm)   Pulse 87   Temp 98.3 °F (36.8 °C)   Ht 5' 4" (1.626 m)   Wt 89.4 kg (197 lb 1.5 oz)   LMP 2024   SpO2 99%   BMI 33.83 kg/m²   General:  alert,appears stated age,cooperative   Abdomen: soft,bowel sounds active,non-tender   Incision:  Midline vertical healing well,no " drainage,no erythema,no hernia,no seroma,no swelling,no dehiscence,incision well approximated   Pelvic NEFG  Vaginal mucosa pink and moist, vaginal cuff visually intact without lesion or mass  Bimanual: non-tender exam. Vaginal cuff with small 5mm defect at the middle of the cuff but unable to completely put digit through defect. Do not suspect full cuff dehiscence. No adnexal fullness or tenderness. Bladder non-tender.        Assessment:       44 y.o.  s/p s/p UAE with IR, JANNETH/BS/cystoscopy 3/21/24 for AUB-L, prolapsing cervical fibroid, acute anemia.   Operative findings again reviewed. Pathology report discussed.  1. Postoperative examination        2. Dysuria  nitrofurantoin, macrocrystal-monohydrate, (MACROBID) 100 MG capsule    Urine Culture High Risk    Wet Prep, Genital    Chlamydia/GC, PCR    Urinalysis, Reflex to Urine Culture      3. Hypertension, unspecified type  lisinopriL 10 MG tablet      4. S/P abdominal hysterectomy                Plan:          Problem List Items Addressed This Visit          Cardiac/Vascular    Hypertension     Patient out of medications. MD recommended evaluation in the ER given blurry vision and risk of stroke. Patient declines to go to the ER and understands risks of not being evaluated. MD refilled HTN medications x3 months and pt to call PCP for appointment for further refills.         Relevant Medications    lisinopriL 10 MG tablet       Renal/    S/P abdominal hysterectomy     Pain likely 2/2 UTI. Will treat empirically, f/up Cx.    Cuff defect appreciated on exam but do not suspect full thickness dehiscence. Plan for repeat exam in 2 weeks. Counseled patient to abstain from intercourse/anything in the vagina during this time. Also continue weight lifting and bathing restrictions.          Other Visit Diagnoses       Postoperative examination    -  Primary    Dysuria        Relevant Medications    nitrofurantoin, macrocrystal-monohydrate, (MACROBID) 100 MG capsule     Other Relevant Orders    Urine Culture High Risk    Wet Prep, Genital (Completed)    Chlamydia/GC, PCR (Completed)    Urinalysis, Reflex to Urine Culture (Completed)          1. Continue any current medications.  2. Wound care discussed.  3. Activity restrictions: continue as above  4. Anticipated return to work: 2-3 weeks.  5. Follow up: 2 weeks for repeat cuff check.    Dena Manning MD, PGY-4  [facility]  05/01/2024 12:58 PM

## 2024-05-01 NOTE — PROGRESS NOTES
"Pt came in with high blood pressure. Pt voiced being out of her BP med for two weeks. Pt voiced having blurred vision. We told pt she needs to go to  ER, pt declined saying "I'm not going to the ER doctor will send my BP meds, I know what I'm doing." KG, CMA    "

## 2024-05-01 NOTE — ASSESSMENT & PLAN NOTE
Pain likely 2/2 UTI. Will treat empirically, f/up Cx.    Cuff defect appreciated on exam but do not suspect full thickness dehiscence. Plan for repeat exam in 2 weeks. Counseled patient to abstain from intercourse/anything in the vagina during this time. Also continue weight lifting and bathing restrictions.  
Patient out of medications. MD recommended evaluation in the ER given blurry vision and risk of stroke. Patient declines to go to the ER and understands risks of not being evaluated. MD refilled HTN medications x3 months and pt to call PCP for appointment for further refills.  
No

## 2024-05-03 LAB — BACTERIA UR CULT: ABNORMAL

## 2024-05-17 ENCOUNTER — OFFICE VISIT (OUTPATIENT)
Dept: GYNECOLOGY | Facility: CLINIC | Age: 44
End: 2024-05-17
Payer: MEDICAID

## 2024-05-17 VITALS
RESPIRATION RATE: 20 BRPM | OXYGEN SATURATION: 100 % | BODY MASS INDEX: 31.49 KG/M2 | SYSTOLIC BLOOD PRESSURE: 170 MMHG | DIASTOLIC BLOOD PRESSURE: 96 MMHG | HEIGHT: 65 IN | HEART RATE: 88 BPM | WEIGHT: 189 LBS | TEMPERATURE: 98 F

## 2024-05-17 DIAGNOSIS — R30.0 DYSURIA: ICD-10-CM

## 2024-05-17 DIAGNOSIS — Z90.710 S/P ABDOMINAL HYSTERECTOMY: Primary | ICD-10-CM

## 2024-05-17 DIAGNOSIS — I10 HYPERTENSION, UNSPECIFIED TYPE: ICD-10-CM

## 2024-05-17 PROCEDURE — 99213 OFFICE O/P EST LOW 20 MIN: CPT | Mod: PBBFAC

## 2024-05-17 RX ORDER — LISINOPRIL 10 MG/1
10 TABLET ORAL
Status: COMPLETED | OUTPATIENT
Start: 2024-05-17 | End: 2024-05-17

## 2024-05-17 RX ADMIN — LISINOPRIL 10 MG: 10 TABLET ORAL at 12:05

## 2024-05-17 NOTE — LETTER
May 17, 2024      [facility]  [address] [address]  Phone: [phone]       Patient: Elli Wu   YOB: 1980  Date of Visit: 05/17/2024    To Whom It May Concern:    Mary Anne Wu  was at [facility] on 05/17/2024. The patient may return to work on 05/20/2024 with no restrictions. If you have any questions or concerns, or if I can be of further assistance, please do not hesitate to contact me.    Sincerely,    Dr. Jami Jamil

## 2024-05-17 NOTE — PROGRESS NOTES
Subjective:      Postoperative Follow-up     Elli Wu is a 44 y.o.  who presents to the clinic 8 weeks status post UAE with IR prior to JANNETH/BS/Cystoscopy for AUB-L, prolapsing cervical fibroid, and acute anemia. At her 6-week post-op visit, a 5mm defect in the vaginal cuff was noted. She denies vaginal bleeding/discharge and lower abdominal pain. In addition, she had reported urinary frequency and dysuria after having sexual intercourse 5 weeks post-operatively. She completed a Macrobid course with complete resolution of symptoms for a few days, which subsequently recurred a few days later, although her dysuria is mild. She denies fevers/chills, nausea/vomiting, hematuria, and flank pain. She is eating a regular diet without difficulty. Bowel movements are normal.     Patient's medications, allergies, past medical, surgical, social and family histories were reviewed and updated as appropriate.    Review of Systems  Pertinent items are noted in HPI.     PATHOLOGY:  24:  UTERINE MASS  - Benign leiomyoma, associated with ulcerated squamous mucosa consistent with vaginal origin.     2.  UTERUS, CERVIX, BILATERAL FALLOPIAN TUBES, HYSTERECTOMY WITH BILATERAL SALPINGECTOMY              MYOMETRIUM                          - Benign leiomyomata.              ENDOMETRIUM                          - Early to mid secretory pattern endometrium.                          - No evidence of endometrial hyperplasia or malignancy.              CERVIX                          - Benign endocervical canal with no evidence of cervical epithelial dysplasia or malignancy.                          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              FALLOPIAN TUBES                          - No significant histopathologic abnormality     Objective:      BP (!) 170/96 (BP Location: Right arm, Patient Position: Lying, BP Method: Medium (Manual)) Comment (BP Method): Gave info for [facility] on [place], does not want to see JOSIAS lema anymore.  Pulse 88   Temp 98 °F (36.7 °C)   Resp 20   Ht 5' 5" (1.651 m)   Wt 85.7 kg (189 lb)   LMP 2024   SpO2 100%   BMI 31.45 kg/m²     General:  alert, appears stated age, cooperative, and alert,appears stated age,cooperative   Abdomen: soft, non-tender   Incision:  well-healing and well-approximated midline vertical incision, no drainage, no erythema, no hernia, no seroma, no swelling, no dehiscence    Speculum Exam: Vaginal vault without lesions or discharge. Vaginal cuff visually intact without any defects appreciated. No blood, discharge, or lesions noted along cuff.   Bimanual Exam: 5mm area within the vaginal cuff to the right of midline that has more anterior than posterior vaginal mucosa incorporated, although still intact with no defect palpated; vaginal cuff non-tender     Assessment:     44 y.o.  s/p UAE with IR and JANNETH/BS/Cystoscopy on 24 for AUB-L, prolapsing cervical fibroid, and acute anemia doing well post-operatively.     Operative findings again reviewed. Pathology report discussed.    1. S/P abdominal hysterectomy        2. Hypertension, unspecified type  lisinopriL tablet 10 mg      3. Dysuria  Urinalysis, Reflex to Urine Culture        Plan:     1. Continue any current medications.  2. Wound care discussed.  3. Activity restrictions: none  4. Anticipated return to work: now.  5. Given recurrence of lower urinary tract symptoms, repeat urinalysis ordered.   6. Follow up: 1  year  for annual WWE or earlier as needed.    Problem List Items Addressed This Visit          Cardiac/Vascular    Hypertension     -- BP elevated to 170/96 mm Hg at visit today. Reports she has not taken her anti-hypertension medication (Lisinopril) today. She has also been missing doses this week.  States she is trying to switch PCPs. She endorses a mild headache currently. Denies blurred vision, chest pain, weakness, numbness.  -- Home dose of " Lisinopril administered in clinic. Given mild headache, advised patient to present to the ED for further evaluation and counseled her regarding the risks of both acute and chronic poorly-controlled hypertension and the importance of taking anti-hypertensive medication as prescribed.            Renal/    S/P abdominal hysterectomy - Primary     Other Visit Diagnoses       Dysuria        Relevant Orders    Urinalysis, Reflex to Urine Culture          Patient and plan discussed with Dr. Jamil.    Shaun Chacko, MS3  [facility] [employer]    Jami Liriano MD  [facility], PGY-3

## 2024-05-20 ENCOUNTER — APPOINTMENT (OUTPATIENT)
Dept: LAB | Facility: HOSPITAL | Age: 44
End: 2024-05-20
Attending: STUDENT IN AN ORGANIZED HEALTH CARE EDUCATION/TRAINING PROGRAM
Payer: MEDICAID

## 2024-05-20 DIAGNOSIS — Z90.710 S/P ABDOMINAL HYSTERECTOMY: ICD-10-CM

## 2024-05-20 DIAGNOSIS — I10 HYPERTENSION, UNSPECIFIED TYPE: Primary | ICD-10-CM

## 2024-05-20 LAB
BACTERIA #/AREA URNS AUTO: ABNORMAL /HPF
BILIRUB UR QL STRIP.AUTO: NEGATIVE
CLARITY UR: CLEAR
COLOR UR AUTO: ABNORMAL
GLUCOSE UR QL STRIP: NORMAL
HGB UR QL STRIP: NEGATIVE
HYALINE CASTS #/AREA URNS LPF: ABNORMAL /LPF
KETONES UR QL STRIP: NEGATIVE
LEUKOCYTE ESTERASE UR QL STRIP: 75
MUCOUS THREADS URNS QL MICRO: ABNORMAL /LPF
NITRITE UR QL STRIP: NEGATIVE
PH UR STRIP: 6 [PH]
PROT UR QL STRIP: NEGATIVE
RBC #/AREA URNS AUTO: ABNORMAL /HPF
SP GR UR STRIP.AUTO: 1.02 (ref 1–1.03)
SQUAMOUS #/AREA URNS LPF: ABNORMAL /HPF
UROBILINOGEN UR STRIP-ACNC: NORMAL
WBC #/AREA URNS AUTO: ABNORMAL /HPF

## 2024-05-20 PROCEDURE — 81001 URINALYSIS AUTO W/SCOPE: CPT

## 2024-05-20 NOTE — ASSESSMENT & PLAN NOTE
-- BP elevated to 170/96 mm Hg at visit today. Reports she has not taken her anti-hypertension medication (Lisinopril) today. She has also been missing doses this week.  States she is trying to switch PCPs. She endorses a mild headache currently. Denies blurred vision, chest pain, weakness, numbness.  -- Home dose of Lisinopril administered in clinic. Given mild headache, advised patient to present to the ED for further evaluation and counseled her regarding the risks of both acute and chronic poorly-controlled hypertension and the importance of taking anti-hypertensive medication as prescribed.

## 2024-05-23 ENCOUNTER — TELEPHONE (OUTPATIENT)
Dept: GYNECOLOGY | Facility: CLINIC | Age: 44
End: 2024-05-23
Payer: MEDICAID

## 2024-05-23 NOTE — TELEPHONE ENCOUNTER
----- Message from Jami Liriano MD sent at 5/23/2024  1:27 PM CDT -----  Regarding: Results  Please advise this patient that her urinalysis did not demonstrate evidence of a UTI. Thank you!  ----- Message -----  From: Lab, Background User  Sent: 5/20/2024   7:47 AM CDT  To: Jami Liriano MD

## 2024-05-23 NOTE — TELEPHONE ENCOUNTER
Called patient to inform that her urinalysis did not demonstrate evidence of a UTI, per provider. Patient did not answer. VM not available at this time.

## 2024-05-24 ENCOUNTER — PATIENT MESSAGE (OUTPATIENT)
Dept: GYNECOLOGY | Facility: CLINIC | Age: 44
End: 2024-05-24
Payer: MEDICAID

## 2024-05-24 ENCOUNTER — TELEPHONE (OUTPATIENT)
Dept: GYNECOLOGY | Facility: CLINIC | Age: 44
End: 2024-05-24
Payer: MEDICAID

## 2024-05-24 NOTE — TELEPHONE ENCOUNTER
Sent patient a message per portal due to unable to reach by phone to give WNL urine results. Some diet suggestions added as well

## 2024-05-24 NOTE — TELEPHONE ENCOUNTER
Called , no answer, no voicemail box ----- Message from Genna Christianson RN sent at 5/23/2024  2:37 PM CDT -----  Regarding: RE: Results  Called again, no answer, no voicemail box to leave message  ----- Message -----  From: Tiffanie Durham MA  Sent: 5/23/2024   1:36 PM CDT  To: [facility] Clinical Support Staff  Subject: FW: Results                                      Patient did not answer. VM not available at this time.  ----- Message -----  From: Jami Liriano MD  Sent: 5/23/2024   1:28 PM CDT  To: [facility] Clinical Support Staff  Subject: Results                                          Please advise this patient that her urinalysis did not demonstrate evidence of a UTI. Thank you!  ----- Message -----  From: Lab, Background User  Sent: 5/20/2024   7:47 AM CDT  To: Jami Liriano MD

## 2024-05-27 ENCOUNTER — TELEPHONE (OUTPATIENT)
Dept: GYNECOLOGY | Facility: CLINIC | Age: 44
End: 2024-05-27
Payer: MEDICAID

## 2024-05-27 NOTE — TELEPHONE ENCOUNTER
Patient came in for UA results, gave her instructions per Dr (had attempted reaching her prior and sent message by portal.), results negative, tips on IC diet given and discussed in detail to see if that helps any .   Patient in agreement.

## 2025-03-11 ENCOUNTER — PATIENT MESSAGE (OUTPATIENT)
Dept: GYNECOLOGY | Facility: CLINIC | Age: 45
End: 2025-03-11
Payer: MEDICAID

## 2025-03-11 ENCOUNTER — TELEPHONE (OUTPATIENT)
Dept: GYNECOLOGY | Facility: CLINIC | Age: 45
End: 2025-03-11
Payer: MEDICAID

## 2025-03-11 NOTE — TELEPHONE ENCOUNTER
----- Message from Katty sent at 3/11/2025  2:33 PM CDT -----  Regarding: Pt Concerns  Pt stated that she is having pain when urinating and is also experiencing some bleeding, both have persisted for a few days now with the bleeding having started first. She is also having pain in her left side. When asked to describe where on her body she was unable to explain. Pt is wanting to be seen by next week. She had expressed that if she can't get an appt she will just come up here. Please advise.Thank you!

## 2025-03-11 NOTE — TELEPHONE ENCOUNTER
Message was sent to pt portal asking her to contact our clinic.     Spoke to Genna and she voiced we can put in a lab order for a Urine culture and she can go to the lab to have it done or she can go to an urgent care or ER.

## 2025-03-12 NOTE — TELEPHONE ENCOUNTER
Spoke to pt and advised her to go to the urgent care or ER. Due to her symptoms and her voicing she took an antibiotic that was not currently prescribed to her. She voiced understanding and said she will go to ER.

## 2025-03-17 ENCOUNTER — HOSPITAL ENCOUNTER (EMERGENCY)
Facility: HOSPITAL | Age: 45
Discharge: LEFT BEFORE DISCHARGE | End: 2025-03-17
Payer: MEDICAID

## 2025-03-17 PROCEDURE — 99900041 HC LEFT WITHOUT BEING SEEN- EMERGENCY

## (undated) DEVICE — SPONGE LAP 18X18 PREWASHED

## (undated) DEVICE — GLOVE SIGNATURE MICRO LTX 7

## (undated) DEVICE — GLOVE SENSICARE NEOPRENE 6.5

## (undated) DEVICE — SPONGE GAUZE 16PLY 4X4

## (undated) DEVICE — PACK FLUENT DISPOSABLE

## (undated) DEVICE — TOWEL OR DISP STRL BLUE 4/PK

## (undated) DEVICE — YANKAUER FLEX NO VENT REG CAP

## (undated) DEVICE — CATH BARDEX URETHRAL RND 16FR

## (undated) DEVICE — PROTECTOR ONE-STEP ARM REG

## (undated) DEVICE — SEALANT VISTASEAL FIBRIN 10ML

## (undated) DEVICE — JELLY SURGILUBE LUBE PKT 3GM

## (undated) DEVICE — DRAPE UNDERBUTTOCKS PCH STRL

## (undated) DEVICE — GLOVE SENSICARE PI GRN 7

## (undated) DEVICE — SOLIDIFIER BOTTLE 1500CC

## (undated) DEVICE — TRAY CATH 1-LYR URIMTR 16FR

## (undated) DEVICE — SUT JJ41G O VICRYL

## (undated) DEVICE — POSITIONER HEAD SUPINE PINK

## (undated) DEVICE — GLOVE SENSICARE NEOPRENE 6

## (undated) DEVICE — PAD CURAD NONADH 3X4IN

## (undated) DEVICE — MARKER WRITESITE SKIN CHLRAPRP

## (undated) DEVICE — PACK DRAPE PERI/GYN TIBURON

## (undated) DEVICE — GLOVE SENSICARE PI GRN 7.5

## (undated) DEVICE — SOL NACL IRR 3000ML

## (undated) DEVICE — GOWN POLY REINF BRTH SLV XL

## (undated) DEVICE — KIT SURGICAL TURNOVER

## (undated) DEVICE — MANIFOLD 4 PORT

## (undated) DEVICE — SUPPORT DONUT ADULT 9IN

## (undated) DEVICE — TRAY SKIN SCRUB WET PREMIUM

## (undated) DEVICE — PAD PREP CUFFED NS 24X48IN